# Patient Record
Sex: MALE | Race: WHITE | Employment: FULL TIME | URBAN - METROPOLITAN AREA
[De-identification: names, ages, dates, MRNs, and addresses within clinical notes are randomized per-mention and may not be internally consistent; named-entity substitution may affect disease eponyms.]

---

## 2019-03-06 ENCOUNTER — HOSPITAL ENCOUNTER (OUTPATIENT)
Age: 69
Discharge: HOME | End: 2019-03-06
Payer: MEDICARE

## 2019-03-06 LAB
ALBUMIN SERPL-MCNC: 3.9 G/DL (ref 3.5–5.2)
ALBUMIN/GLOBULIN RATIO: 1 (ref 1–2.5)
ALP BLD-CCNC: 94 U/L (ref 40–129)
ALT SERPL-CCNC: 20 U/L (ref 5–41)
ANION GAP SERPL CALCULATED.3IONS-SCNC: 12 MMOL/L (ref 9–17)
AST SERPL-CCNC: 15 U/L
BILIRUB SERPL-MCNC: 0.28 MG/DL (ref 0.3–1.2)
BILIRUBIN DIRECT: 0.08 MG/DL
BILIRUBIN, INDIRECT: 0.2 MG/DL (ref 0–1)
BUN BLDV-MCNC: 22 MG/DL (ref 8–23)
BUN/CREAT BLD: ABNORMAL (ref 9–20)
CALCIUM SERPL-MCNC: 9.4 MG/DL (ref 8.6–10.4)
CARCINOEMBRYONIC ANTIGEN: 5.9 NG/ML
CHLORIDE BLD-SCNC: 100 MMOL/L (ref 98–107)
CHOLESTEROL, FASTING: 208 MG/DL
CHOLESTEROL/HDL RATIO: 4.3
CO2: 26 MMOL/L (ref 20–31)
CREAT SERPL-MCNC: 0.91 MG/DL (ref 0.7–1.2)
CREATININE URINE: 72.3 MG/DL (ref 39–259)
ESTIMATED AVERAGE GLUCOSE: 358 MG/DL
GFR AFRICAN AMERICAN: >60 ML/MIN
GFR NON-AFRICAN AMERICAN: >60 ML/MIN
GFR SERPL CREATININE-BSD FRML MDRD: ABNORMAL ML/MIN/{1.73_M2}
GFR SERPL CREATININE-BSD FRML MDRD: ABNORMAL ML/MIN/{1.73_M2}
GLOBULIN: ABNORMAL G/DL (ref 1.5–3.8)
GLUCOSE FASTING: 387 MG/DL (ref 70–99)
HBA1C MFR BLD: 14.1 % (ref 4–6)
HCT VFR BLD CALC: 45.3 % (ref 40.7–50.3)
HDLC SERPL-MCNC: 48 MG/DL
HEMOGLOBIN: 14.6 G/DL (ref 13–17)
LDL CHOLESTEROL: 128 MG/DL (ref 0–130)
MCH RBC QN AUTO: 26.9 PG (ref 25.2–33.5)
MCHC RBC AUTO-ENTMCNC: 32.2 G/DL (ref 28.4–34.8)
MCV RBC AUTO: 83.4 FL (ref 82.6–102.9)
MICROALBUMIN/CREAT 24H UR: 193 MG/L
MICROALBUMIN/CREAT UR-RTO: 267 MCG/MG CREAT
NRBC AUTOMATED: 0 PER 100 WBC
PDW BLD-RTO: 12.5 % (ref 11.8–14.4)
PLATELET # BLD: 243 K/UL (ref 138–453)
PMV BLD AUTO: 10.4 FL (ref 8.1–13.5)
POTASSIUM SERPL-SCNC: 4.6 MMOL/L (ref 3.7–5.3)
PROSTATE SPECIFIC ANTIGEN: 13.46 UG/L
RBC # BLD: 5.43 M/UL (ref 4.21–5.77)
SODIUM BLD-SCNC: 138 MMOL/L (ref 135–144)
TOTAL PROTEIN: 7.8 G/DL (ref 6.4–8.3)
TRIGLYCERIDE, FASTING: 161 MG/DL
TSH SERPL DL<=0.05 MIU/L-ACNC: 1.24 MIU/L (ref 0.3–5)
VITAMIN D 25-HYDROXY: 27.3 NG/ML (ref 30–100)
VLDLC SERPL CALC-MCNC: ABNORMAL MG/DL (ref 1–30)
WBC # BLD: 6.1 K/UL (ref 3.5–11.3)

## 2019-03-06 PROCEDURE — 85027 COMPLETE CBC AUTOMATED: CPT

## 2019-03-06 PROCEDURE — 82378 CARCINOEMBRYONIC ANTIGEN: CPT

## 2019-03-06 PROCEDURE — 82043 UR ALBUMIN QUANTITATIVE: CPT

## 2019-03-06 PROCEDURE — 84443 ASSAY THYROID STIM HORMONE: CPT

## 2019-03-06 PROCEDURE — G0103 PSA SCREENING: HCPCS

## 2019-03-06 PROCEDURE — 36415 COLL VENOUS BLD VENIPUNCTURE: CPT

## 2019-03-06 PROCEDURE — 82306 VITAMIN D 25 HYDROXY: CPT

## 2019-03-06 PROCEDURE — 83036 HEMOGLOBIN GLYCOSYLATED A1C: CPT

## 2019-03-06 PROCEDURE — 82570 ASSAY OF URINE CREATININE: CPT

## 2019-03-06 PROCEDURE — 80076 HEPATIC FUNCTION PANEL: CPT

## 2019-03-06 PROCEDURE — 80048 BASIC METABOLIC PNL TOTAL CA: CPT

## 2019-03-06 PROCEDURE — 80061 LIPID PANEL: CPT

## 2019-04-01 ENCOUNTER — OFFICE VISIT (OUTPATIENT)
Dept: GASTROENTEROLOGY | Age: 69
End: 2019-04-01
Payer: MEDICARE

## 2019-04-01 VITALS
DIASTOLIC BLOOD PRESSURE: 93 MMHG | HEIGHT: 67 IN | SYSTOLIC BLOOD PRESSURE: 164 MMHG | BODY MASS INDEX: 17.89 KG/M2 | WEIGHT: 114 LBS | HEART RATE: 67 BPM

## 2019-04-01 DIAGNOSIS — R97.0 ELEVATED CEA: Primary | ICD-10-CM

## 2019-04-01 PROCEDURE — 99204 OFFICE O/P NEW MOD 45 MIN: CPT | Performed by: INTERNAL MEDICINE

## 2019-04-01 PROCEDURE — 3017F COLORECTAL CA SCREEN DOC REV: CPT | Performed by: INTERNAL MEDICINE

## 2019-04-01 PROCEDURE — G8419 CALC BMI OUT NRM PARAM NOF/U: HCPCS | Performed by: INTERNAL MEDICINE

## 2019-04-01 PROCEDURE — G8427 DOCREV CUR MEDS BY ELIG CLIN: HCPCS | Performed by: INTERNAL MEDICINE

## 2019-04-01 RX ORDER — LISINOPRIL 5 MG/1
5 TABLET ORAL DAILY
COMMUNITY
End: 2019-09-09

## 2019-04-01 ASSESSMENT — ENCOUNTER SYMPTOMS
CHEST TIGHTNESS: 0
DIARRHEA: 0
EYE PAIN: 0
ABDOMINAL PAIN: 0
WHEEZING: 0
SHORTNESS OF BREATH: 0
SINUS PAIN: 0
ABDOMINAL DISTENTION: 0
SINUS PRESSURE: 0
RECTAL PAIN: 0
BACK PAIN: 0
BLOOD IN STOOL: 0
VOMITING: 0
NAUSEA: 0
TROUBLE SWALLOWING: 0
CONSTIPATION: 1
ANAL BLEEDING: 0
EYE REDNESS: 0

## 2019-04-01 NOTE — PROGRESS NOTES
Subjective:      Patient ID: Vimal Porras is a 68 y.o. male. HPI     Dr. Marni Horvath MD has requested that I see Vimal Porras for a consult for No diagnosis found. .        Patient seen with a history of elevated CEA level. His CEA level was in normal range in 2016. At present CEA level is 5.9. Patient is not a smoker. He does not have COPD. Denies dysphagia, heartburn or dyspepsia. Has a good appetite and there is no weight loss. Has a satisfactory bowel movements. No melena or hematochezia. Patient appears to have other medical issues including uncontrolled diabetes. His PSA is elevated at 13.46. Hemoglobin 14.6. Platelet count 490. Liver battery within normal limits. No family history of GI malignancy. Patient denies colonoscopy in the past.  He denies any GI issues in the past.        Past Medical, Family, and Social History reviewed and does contribute to the patient presenting condition. patient\"s PMH/PSH,SH,PSYCH hx, MEDs, ALLERGIES, and ROS was all reviewed and updated ion the appropriate sections        Review of Systems   Constitutional: Negative for appetite change, fatigue and unexpected weight change. HENT: Negative for sinus pressure, sinus pain and trouble swallowing. Eyes: Positive for visual disturbance (glasses). Negative for pain and redness. Respiratory: Negative for chest tightness, shortness of breath and wheezing. Cardiovascular: Negative for chest pain, palpitations and leg swelling. Gastrointestinal: Positive for constipation. Negative for abdominal distention, abdominal pain, anal bleeding, blood in stool, diarrhea, nausea, rectal pain and vomiting. Endocrine: Negative for cold intolerance and heat intolerance. Genitourinary: Negative for difficulty urinating, frequency and urgency. Musculoskeletal: Negative for arthralgias, back pain and neck pain. Skin: Negative.     Allergic/Immunologic: Negative for environmental allergies and food allergies. Neurological: Negative for dizziness, weakness and headaches. Hematological: Does not bruise/bleed easily. Psychiatric/Behavioral: Negative for agitation and sleep disturbance. The patient is not nervous/anxious. Objective:   Physical Exam   Constitutional: He is oriented to person, place, and time. He appears well-developed and well-nourished. No distress. HENT:   Head: Normocephalic and atraumatic. Mouth/Throat: No oropharyngeal exudate. Eyes: Pupils are equal, round, and reactive to light. Conjunctivae are normal. No scleral icterus. Neck: Normal range of motion. Neck supple. No tracheal deviation present. No thyromegaly present. Cardiovascular: Normal rate, regular rhythm, normal heart sounds and intact distal pulses. No murmur heard. Pulmonary/Chest: Effort normal and breath sounds normal. No respiratory distress. He has no wheezes. He has no rales. Abdominal: Soft. Bowel sounds are normal. He exhibits no distension, no ascites and no mass. There is no hepatomegaly. There is no tenderness. There is no guarding. No hernia. No peripheral signs of ch. Liver disease   Genitourinary: Rectum normal. Rectal exam shows guaiac negative stool. Musculoskeletal: He exhibits no edema. Lymphadenopathy:     He has no cervical adenopathy. Neurological: He is alert and oriented to person, place, and time. No cranial nerve deficit. Skin: Skin is warm and dry. No rash noted. No erythema. Psychiatric: Thought content normal.   Nursing note and vitals reviewed. Assessment:       Diagnosis Orders   1. Elevated CEA  COLONOSCOPY W/ OR W/O BIOPSY           Plan: At present his stool is negative for occult blood. Abdominal examination benign.     Given his age, CEA level is elevated, he needs colonoscopy to evaluate colonic pathology including malignancy, polyps etc.    Patient is advised to see PCP regarding markedly elevated PSA levels, and abnormal hemoglobin A1c.    After discussion patient understood and agreed. The Endoscopic procedure was explained to the patient in detail  The prep and NPO were explained  All the Risks, Benefits, and Alternatives were explained  Risk of Bleeding, Perforation and Cardio Respiratory risks were explained  his questions were answered  The procedure has been scheduled with the  in the office  Patient was asked to give us a call for any questions  The patient has verbalized understanding and agreement to this plan.

## 2019-05-03 ENCOUNTER — TELEPHONE (OUTPATIENT)
Dept: GASTROENTEROLOGY | Age: 69
End: 2019-05-03

## 2019-05-06 ENCOUNTER — HOSPITAL ENCOUNTER (OUTPATIENT)
Age: 69
Setting detail: OUTPATIENT SURGERY
Discharge: HOME | End: 2019-05-06
Attending: INTERNAL MEDICINE | Admitting: INTERNAL MEDICINE
Payer: MEDICARE

## 2019-05-06 ENCOUNTER — ANESTHESIA (OUTPATIENT)
Dept: OPERATING ROOM | Age: 69
End: 2019-05-06
Payer: MEDICARE

## 2019-05-06 ENCOUNTER — ANESTHESIA EVENT (OUTPATIENT)
Dept: OPERATING ROOM | Age: 69
End: 2019-05-06
Payer: MEDICARE

## 2019-05-06 VITALS — DIASTOLIC BLOOD PRESSURE: 60 MMHG | OXYGEN SATURATION: 100 % | SYSTOLIC BLOOD PRESSURE: 102 MMHG

## 2019-05-06 VITALS
OXYGEN SATURATION: 100 % | RESPIRATION RATE: 14 BRPM | TEMPERATURE: 97.7 F | HEIGHT: 67 IN | HEART RATE: 66 BPM | DIASTOLIC BLOOD PRESSURE: 82 MMHG | BODY MASS INDEX: 17.89 KG/M2 | WEIGHT: 114 LBS | SYSTOLIC BLOOD PRESSURE: 153 MMHG

## 2019-05-06 LAB
CARCINOEMBRYONIC ANTIGEN: 3.8 NG/ML
GLUCOSE BLD-MCNC: 102 MG/DL (ref 75–110)
GLUCOSE BLD-MCNC: 98 MG/DL (ref 75–110)

## 2019-05-06 PROCEDURE — 2580000003 HC RX 258: Performed by: ANESTHESIOLOGY

## 2019-05-06 PROCEDURE — 36415 COLL VENOUS BLD VENIPUNCTURE: CPT

## 2019-05-06 PROCEDURE — 82947 ASSAY GLUCOSE BLOOD QUANT: CPT

## 2019-05-06 PROCEDURE — 2709999900 HC NON-CHARGEABLE SUPPLY: Performed by: INTERNAL MEDICINE

## 2019-05-06 PROCEDURE — 3700000001 HC ADD 15 MINUTES (ANESTHESIA): Performed by: INTERNAL MEDICINE

## 2019-05-06 PROCEDURE — 82378 CARCINOEMBRYONIC ANTIGEN: CPT

## 2019-05-06 PROCEDURE — 6360000002 HC RX W HCPCS: Performed by: SPECIALIST

## 2019-05-06 PROCEDURE — 7100000000 HC PACU RECOVERY - FIRST 15 MIN: Performed by: INTERNAL MEDICINE

## 2019-05-06 PROCEDURE — 2500000003 HC RX 250 WO HCPCS: Performed by: SPECIALIST

## 2019-05-06 PROCEDURE — 7100000001 HC PACU RECOVERY - ADDTL 15 MIN: Performed by: INTERNAL MEDICINE

## 2019-05-06 PROCEDURE — 3700000000 HC ANESTHESIA ATTENDED CARE: Performed by: INTERNAL MEDICINE

## 2019-05-06 PROCEDURE — 3609009500 HC COLONOSCOPY DIAGNOSTIC OR SCREENING: Performed by: INTERNAL MEDICINE

## 2019-05-06 RX ORDER — AMLODIPINE BESYLATE 5 MG/1
5 TABLET ORAL DAILY
Refills: 5 | COMMUNITY
Start: 2019-04-29

## 2019-05-06 RX ORDER — METOPROLOL TARTRATE 50 MG/1
50 TABLET, FILM COATED ORAL 2 TIMES DAILY
Refills: 5 | COMMUNITY
Start: 2019-04-16

## 2019-05-06 RX ORDER — PROPOFOL 10 MG/ML
INJECTION, EMULSION INTRAVENOUS PRN
Status: DISCONTINUED | OUTPATIENT
Start: 2019-05-06 | End: 2019-05-06 | Stop reason: SDUPTHER

## 2019-05-06 RX ORDER — SODIUM CHLORIDE 9 MG/ML
INJECTION, SOLUTION INTRAVENOUS CONTINUOUS
Status: DISCONTINUED | OUTPATIENT
Start: 2019-05-06 | End: 2019-05-06 | Stop reason: HOSPADM

## 2019-05-06 RX ORDER — LIDOCAINE HYDROCHLORIDE 10 MG/ML
INJECTION, SOLUTION EPIDURAL; INFILTRATION; INTRACAUDAL; PERINEURAL PRN
Status: DISCONTINUED | OUTPATIENT
Start: 2019-05-06 | End: 2019-05-06 | Stop reason: SDUPTHER

## 2019-05-06 RX ORDER — LISINOPRIL 20 MG/1
20 TABLET ORAL 2 TIMES DAILY
Refills: 5 | COMMUNITY
Start: 2019-04-16

## 2019-05-06 RX ORDER — POLYETHYLENE GLYCOL-3350 AND ELECTROLYTES 236; 6.74; 5.86; 2.97; 22.74 G/274.31G; G/274.31G; G/274.31G; G/274.31G; G/274.31G
POWDER, FOR SOLUTION ORAL
Refills: 0 | COMMUNITY
Start: 2019-04-02 | End: 2019-09-09

## 2019-05-06 RX ORDER — GLIPIZIDE 5 MG/1
5 TABLET ORAL
Refills: 5 | COMMUNITY
Start: 2019-04-29 | End: 2021-07-15

## 2019-05-06 RX ADMIN — SODIUM CHLORIDE: 9 INJECTION, SOLUTION INTRAVENOUS at 08:45

## 2019-05-06 RX ADMIN — PROPOFOL 50 MG: 10 INJECTION, EMULSION INTRAVENOUS at 08:48

## 2019-05-06 RX ADMIN — PROPOFOL 20 MG: 10 INJECTION, EMULSION INTRAVENOUS at 09:00

## 2019-05-06 RX ADMIN — SODIUM CHLORIDE: 9 INJECTION, SOLUTION INTRAVENOUS at 08:11

## 2019-05-06 RX ADMIN — LIDOCAINE HYDROCHLORIDE 25 MG: 10 INJECTION, SOLUTION EPIDURAL; INFILTRATION; INTRACAUDAL; PERINEURAL at 08:48

## 2019-05-06 RX ADMIN — PROPOFOL 50 MG: 10 INJECTION, EMULSION INTRAVENOUS at 08:54

## 2019-05-06 SDOH — HEALTH STABILITY: MENTAL HEALTH: HOW OFTEN DO YOU HAVE A DRINK CONTAINING ALCOHOL?: NEVER

## 2019-05-06 ASSESSMENT — PULMONARY FUNCTION TESTS
PIF_VALUE: 1
PIF_VALUE: 0
PIF_VALUE: 1

## 2019-05-06 ASSESSMENT — PAIN SCALES - GENERAL: PAINLEVEL_OUTOF10: 0

## 2019-05-06 ASSESSMENT — PAIN - FUNCTIONAL ASSESSMENT: PAIN_FUNCTIONAL_ASSESSMENT: 0-10

## 2019-05-06 NOTE — OP NOTE
5. Precautions to avoid constipation     Next colonoscopy: 10 years.       Electronically signed by Cherie Villafuerte MD  on 5/6/2019 at 9:18 AM

## 2019-05-06 NOTE — ANESTHESIA POSTPROCEDURE EVALUATION
Department of Anesthesiology  Postprocedure Note    Patient: Madi Bernstein  MRN: [id number]  YOB: 1950  Date of evaluation: 5/6/2019  Time:  5:24 PM     Procedure Summary     Date:  05/06/19 Room / Location:  [address] [place] [address] 01 / [address]    Anesthesia Start:  0845 Anesthesia Stop:  6500    Procedure:  COLONOSCOPY DIAGNOSTIC (N/A ) Diagnosis:  (ELEVATED CEA   PAT ON ADMIT PER ANES)    Surgeon:  Violet Mack MD Responsible Provider:  Genesis Muniz MD    Anesthesia Type:  MAC ASA Status:  3          Anesthesia Type: MAC    Kellie Phase I: Ekllie Score: 10    Kellie Phase II:      Last vitals: Reviewed and per EMR flowsheets.        Anesthesia Post Evaluation    Comments: POST- ANESTHESIA EVALUATION       Pt Name: Madi Bernstein  MRN: [id number]  YOB: 1950  Date of evaluation: 5/6/2019  Time:  5:24 PM      BP (!) 153/82   Pulse 66   Temp 97.7 °F (36.5 °C) (Infrared)   Resp 14   Ht 5' 6.5\" (1.689 m)   Wt 114 lb (51.7 kg)   SpO2 100%   BMI 18.12 kg/m²      Consciousness Level  Awake  Cardiopulmonary Status  Stable  Pain Adequately Treated YES  Nausea / Vomiting  NO  Adequate Hydration  YES  Anesthesia Related Complications NONE      Electronically signed by Genesis Muniz MD on 5/6/2019 at 5:24 PM

## 2019-05-06 NOTE — ANESTHESIA PRE PROCEDURE
Department of Anesthesiology  Preprocedure Note       Name:  Bella Gayle   Age:  68 y.o.  :  1950                                          MRN:  [id number]         Date:  2019      Surgeon: Karen Soto):  [facility] [address], MD    Procedure: COLONOSCOPY DIAGNOSTIC (N/A )    Medications prior to admission:   Prior to Admission medications    Medication Sig Start Date End Date Taking? Authorizing Provider   amLODIPine (NORVASC) 5 MG tablet  19  Yes Historical Provider, MD   glipiZIDE (GLUCOTROL) 5 MG tablet  19  Yes Historical Provider, MD   lisinopril (PRINIVIL;ZESTRIL) 20 MG tablet  19  Yes Historical Provider, MD   metoprolol tartrate (LOPRESSOR) 50 MG tablet  19  Yes Historical Provider, MD   GAVILYTE-G 236 g solution  19  Yes Historical Provider, MD   metFORMIN (GLUCOPHAGE) 500 MG tablet Take 500 mg by mouth 2 times daily (with meals)   Yes Historical Provider, MD   lisinopril (PRINIVIL;ZESTRIL) 5 MG tablet Take 5 mg by mouth daily   Yes Historical Provider, MD       Current medications:    Current Facility-Administered Medications   Medication Dose Route Frequency Provider Last Rate Last Dose    0.9 % sodium chloride infusion   Intravenous Continuous [place] MD Kelley 100 mL/hr at 19 [id number]         Allergies:  No Known Allergies    Problem List:  There is no problem list on file for this patient.       Past Medical History:        Diagnosis Date    Diabetes mellitus ([address]     Hypertension        Past Surgical History:        Procedure Laterality Date    COLONOSCOPY      HERNIA REPAIR Right     inguinal       Social History:    Social History     Tobacco Use    Smoking status: Never Smoker    Smokeless tobacco: Never Used   Substance Use Topics    Alcohol use: Never     Frequency: Never                                Counseling given: Not Answered      Vital Signs (Current):   Vitals:    19 0744 19 0747   BP:  137/83   Pulse:  68   Resp:  16   Temp:  97.5 °F (36.4 °C)   TempSrc:  Oral   SpO2:  100%   Weight: 114 lb (51.7 kg)    Height: 5' 6.5\" (1.689 m)                                               BP Readings from Last 3 Encounters:   05/06/19 137/83   04/01/19 (!) 164/93       NPO Status: Time of last liquid consumption: 2320                        Time of last solid consumption: 2100                        Date of last liquid consumption: 05/05/19                        Date of last solid food consumption: 05/04/19    BMI:   Wt Readings from Last 3 Encounters:   05/06/19 114 lb (51.7 kg)   04/01/19 114 lb (51.7 kg)     Body mass index is 18.12 kg/m².     CBC:   Lab Results   Component Value Date    WBC 6.1 03/06/2019    RBC 5.43 03/06/2019    HGB 14.6 03/06/2019    HCT 45.3 03/06/2019    MCV 83.4 03/06/2019    RDW 12.5 03/06/2019     03/06/2019       CMP:   Lab Results   Component Value Date     03/06/2019    K 4.6 03/06/2019     03/06/2019    CO2 26 03/06/2019    BUN 22 03/06/2019    CREATININE 0.91 03/06/2019    GFRAA >60 03/06/2019    LABGLOM >60 03/06/2019    GLUCOSE 238 10/04/2016    PROT 7.8 03/06/2019    CALCIUM 9.4 03/06/2019    BILITOT 0.28 03/06/2019    ALKPHOS 94 03/06/2019    AST 15 03/06/2019    ALT 20 03/06/2019       POC Tests:   Recent Labs     05/06/19  0809   POCGLU 102       Coags: No results found for: PROTIME, INR, APTT    HCG (If Applicable): No results found for: PREGTESTUR, PREGSERUM, HCG, HCGQUANT     ABGs: No results found for: PHART, PO2ART, IDO8GCC, RIJ7QWF, BEART, M0AHHMGQ     Type & Screen (If Applicable):  No results found for: LABABO, [address]    Anesthesia Evaluation  Patient summary reviewed and Nursing notes reviewed no history of anesthetic complications:   Airway: Mallampati: II  TM distance: >3 FB   Neck ROM: full  Mouth opening: > = 3 FB Dental: normal exam         Pulmonary:Negative Pulmonary ROS and normal exam  breath sounds clear to auscultation                             Cardiovascular:    (+) hypertension:,         Rhythm: regular  Rate: normal                    Neuro/Psych:   Negative Neuro/Psych ROS              GI/Hepatic/Renal: Neg GI/Hepatic/Renal ROS            Endo/Other:    (+) DiabetesType II DM, well controlled, , .                 Abdominal:           Vascular: negative vascular ROS. Anesthesia Plan      MAC     ASA 3       Induction: intravenous. MIPS: Postoperative opioids intended and Prophylactic antiemetics administered. Anesthetic plan and risks discussed with patient.                       Juan Daniel Morales MD   5/6/2019

## 2019-05-06 NOTE — H&P
HISTORY and [address]       NAME:  Niki Gtz  MRN: [id number]   YOB: 1950   Date: 5/6/2019   Age: 68 y.o. Gender: male       COMPLAINT AND PRESENT HISTORY:     Niki Gtz is 68 y.o., [place] [facility])  male, having a Dx. Colonoscopy. Elevated CEA 6 weeks ago, as a part of his annual workup. Patient denies any GI symptoms. No nausea / vomiting, No diarrhea or constipation. No abdominal pains or cramping, No heartburn, no changes in the color of the stools. No fever or chills. No recent weight loss. PAST MEDICAL HISTORY     Past Medical History:   Diagnosis Date    Diabetes mellitus ([address]     Hypertension        SURGICAL HISTORY       Past Surgical History:   Procedure Laterality Date    COLONOSCOPY      HERNIA REPAIR Right     inguinal       FAMILY HISTORY     History reviewed. No pertinent family history. SOCIAL HISTORY       Social History     Socioeconomic History    Marital status:       Spouse name: None    Number of children: None    Years of education: None    Highest education level: None   Occupational History    None   Social Needs    Financial resource strain: None    Food insecurity:     Worry: None     Inability: None    Transportation needs:     Medical: None     Non-medical: None   Tobacco Use    Smoking status: Never Smoker    Smokeless tobacco: Never Used   Substance and Sexual Activity    Alcohol use: Never     Frequency: Never    Drug use: Never    Sexual activity: None   Lifestyle    Physical activity:     Days per week: None     Minutes per session: None    Stress: None   Relationships    Social connections:     Talks on phone: None     Gets together: None     Attends [employer] service: None     Active member of club or organization: None     Attends meetings of clubs or organizations: None     Relationship status: None    Intimate partner violence:     Fear of current or ex partner: None     Emotionally abused:

## 2019-09-09 ENCOUNTER — HOSPITAL ENCOUNTER (OUTPATIENT)
Dept: PREADMISSION TESTING | Age: 69
Discharge: HOME | End: 2019-09-13
Payer: MEDICARE

## 2019-09-09 VITALS
OXYGEN SATURATION: 97 % | HEART RATE: 68 BPM | TEMPERATURE: 98.1 F | RESPIRATION RATE: 16 BRPM | DIASTOLIC BLOOD PRESSURE: 84 MMHG | BODY MASS INDEX: 23.31 KG/M2 | HEIGHT: 67 IN | SYSTOLIC BLOOD PRESSURE: 138 MMHG | WEIGHT: 148.5 LBS

## 2019-09-09 LAB
ABO/RH: NORMAL
ANION GAP SERPL CALCULATED.3IONS-SCNC: 12 MMOL/L (ref 9–17)
ANTIBODY SCREEN: NEGATIVE
ARM BAND NUMBER: NORMAL
BUN BLDV-MCNC: 24 MG/DL (ref 8–23)
CHLORIDE BLD-SCNC: 97 MMOL/L (ref 98–107)
CO2: 27 MMOL/L (ref 20–31)
CREAT SERPL-MCNC: 0.78 MG/DL (ref 0.7–1.2)
EXPIRATION DATE: NORMAL
GFR AFRICAN AMERICAN: >60 ML/MIN
GFR NON-AFRICAN AMERICAN: >60 ML/MIN
GFR SERPL CREATININE-BSD FRML MDRD: ABNORMAL ML/MIN/{1.73_M2}
GFR SERPL CREATININE-BSD FRML MDRD: ABNORMAL ML/MIN/{1.73_M2}
GLUCOSE BLD-MCNC: 267 MG/DL (ref 70–99)
HCT VFR BLD CALC: 43.4 % (ref 40.7–50.3)
HEMOGLOBIN: 13.9 G/DL (ref 13–17)
MCH RBC QN AUTO: 26.7 PG (ref 25.2–33.5)
MCHC RBC AUTO-ENTMCNC: 32 G/DL (ref 28.4–34.8)
MCV RBC AUTO: 83.5 FL (ref 82.6–102.9)
NRBC AUTOMATED: 0 PER 100 WBC
PDW BLD-RTO: 12.7 % (ref 11.8–14.4)
PLATELET # BLD: 245 K/UL (ref 138–453)
PMV BLD AUTO: 10 FL (ref 8.1–13.5)
POTASSIUM SERPL-SCNC: 4.3 MMOL/L (ref 3.7–5.3)
RBC # BLD: 5.2 M/UL (ref 4.21–5.77)
SODIUM BLD-SCNC: 136 MMOL/L (ref 135–144)
WBC # BLD: 6.4 K/UL (ref 3.5–11.3)

## 2019-09-09 PROCEDURE — 87086 URINE CULTURE/COLONY COUNT: CPT

## 2019-09-09 PROCEDURE — 80051 ELECTROLYTE PANEL: CPT

## 2019-09-09 PROCEDURE — 84520 ASSAY OF UREA NITROGEN: CPT

## 2019-09-09 PROCEDURE — 82565 ASSAY OF CREATININE: CPT

## 2019-09-09 PROCEDURE — 86901 BLOOD TYPING SEROLOGIC RH(D): CPT

## 2019-09-09 PROCEDURE — 86850 RBC ANTIBODY SCREEN: CPT

## 2019-09-09 PROCEDURE — 85027 COMPLETE CBC AUTOMATED: CPT

## 2019-09-09 PROCEDURE — 36415 COLL VENOUS BLD VENIPUNCTURE: CPT

## 2019-09-09 PROCEDURE — 86900 BLOOD TYPING SEROLOGIC ABO: CPT

## 2019-09-09 PROCEDURE — 82947 ASSAY GLUCOSE BLOOD QUANT: CPT

## 2019-09-09 PROCEDURE — 93005 ELECTROCARDIOGRAM TRACING: CPT | Performed by: ANESTHESIOLOGY

## 2019-09-09 RX ORDER — SODIUM CHLORIDE, SODIUM LACTATE, POTASSIUM CHLORIDE, CALCIUM CHLORIDE 600; 310; 30; 20 MG/100ML; MG/100ML; MG/100ML; MG/100ML
1000 INJECTION, SOLUTION INTRAVENOUS CONTINUOUS
Status: CANCELLED | OUTPATIENT
Start: 2019-09-09

## 2019-09-09 NOTE — H&P
no acute distress. Very pleasant and talkative. SKIN:  Warm and dry, no rashes   HEAD:  Normocephalic, atraumatic   EYES: PERRL. EOMs intact. Wearing glasses. EARS:  Hearing grossly WNL. NOSE:  Nares patent. No rhinorrhea. MOUTH/THROAT:  benign  NECK:supple, no lymphadenopathy  LUNGS: Clear to auscultation bilaterally, no wheezes. CARDIOVASCULAR: Heart sounds are normal.  Regular rate and rhythm without murmur. ABDOMEN: soft, non tender, non distended. EXTREMITIES: no edema bilateral lower extremities. Testing:   EK19  Labs pending: drawn 2019     IMPRESSIONS:   1. Prostate cancer  2.  has a past medical history of Diabetes mellitus ([address] History of TIA (transient ischemic attack), Hypertension, Prostate cancer ([address] Wears dentures, and Wears glasses.    PLANS:   1. prostatectomy    ASHLEY KOHLER PA-C  Electronically signed 2019 at 4:22 PM

## 2019-09-09 NOTE — PROGRESS NOTES
Anesthesia Focused Assessment    STOP-BANG Sleep Apnea Questionnaire    SNORE loudly (heard through closed doors)? No  TIRED, fatigued, sleepy during daytime? No  OBSERVED stopping breathing during sleep? No  High blood PRESSURE being treated? Yes    BMI over 35? No  AGE over 48? Yes  NECK circumference over 16\"? No  GENDER (male)? Yes             Total 3  High risk 5-8  Intermediate risk 3-4  Low risk 0-2    Obstructive Sleep Apnea: denies  If YES, machine used: no     Type 1 DM:   no  T2DM:  yes    Coronary Artery Disease:  no  Hypertension:  yes    Active smoker:  no  Drinks Alcohol:  no    Dentition: benign    Defib / AICD / Pacemaker: no      Renal Failure/dialysis:  no    Patient was evaluated in PAT & anesthesia guidelines were applied. NPO guidelines, medication instructions and scheduled arrival time were reviewed with patient. Hx of anesthesia complications:  no  Family hx of anesthesia complications:  no                                                                                                                     Anesthesia contacted:   Yes, Dr. Guadalupe Speed or cardiac clearance ordered: no clearance per Dr. Kirill Vo.     Romulo Bergman PA-C  9/9/19  3:47 PM

## 2019-09-10 LAB
CULTURE: NO GROWTH
Lab: NORMAL
SPECIMEN DESCRIPTION: NORMAL

## 2019-09-12 LAB
EKG ATRIAL RATE: 62 BPM
EKG P AXIS: 26 DEGREES
EKG P-R INTERVAL: 178 MS
EKG Q-T INTERVAL: 434 MS
EKG QRS DURATION: 104 MS
EKG QTC CALCULATION (BAZETT): 440 MS
EKG R AXIS: -21 DEGREES
EKG T AXIS: 41 DEGREES
EKG VENTRICULAR RATE: 62 BPM

## 2019-09-12 PROCEDURE — 93010 ELECTROCARDIOGRAM REPORT: CPT | Performed by: INTERNAL MEDICINE

## 2019-09-18 ENCOUNTER — ANESTHESIA EVENT (OUTPATIENT)
Dept: OPERATING ROOM | Age: 69
End: 2019-09-18
Payer: MEDICARE

## 2019-09-18 ENCOUNTER — HOSPITAL ENCOUNTER (OUTPATIENT)
Age: 69
Setting detail: OBSERVATION
Discharge: HOME | End: 2019-09-19
Attending: UROLOGY | Admitting: UROLOGY
Payer: MEDICARE

## 2019-09-18 ENCOUNTER — ANESTHESIA (OUTPATIENT)
Dept: OPERATING ROOM | Age: 69
End: 2019-09-18
Payer: MEDICARE

## 2019-09-18 VITALS — TEMPERATURE: 94.5 F | DIASTOLIC BLOOD PRESSURE: 93 MMHG | OXYGEN SATURATION: 100 % | SYSTOLIC BLOOD PRESSURE: 168 MMHG

## 2019-09-18 DIAGNOSIS — G89.18 POST-OPERATIVE PAIN: Primary | ICD-10-CM

## 2019-09-18 DIAGNOSIS — C61 PROSTATE CANCER (HCC): ICD-10-CM

## 2019-09-18 LAB
ANION GAP SERPL CALCULATED.3IONS-SCNC: 13 MMOL/L (ref 9–17)
BUN BLDV-MCNC: 25 MG/DL (ref 8–23)
BUN/CREAT BLD: ABNORMAL (ref 9–20)
CALCIUM SERPL-MCNC: 8.5 MG/DL (ref 8.6–10.4)
CHLORIDE BLD-SCNC: 99 MMOL/L (ref 98–107)
CO2: 24 MMOL/L (ref 20–31)
CREAT SERPL-MCNC: 0.88 MG/DL (ref 0.7–1.2)
GFR AFRICAN AMERICAN: >60 ML/MIN
GFR NON-AFRICAN AMERICAN: >60 ML/MIN
GFR SERPL CREATININE-BSD FRML MDRD: ABNORMAL ML/MIN/{1.73_M2}
GFR SERPL CREATININE-BSD FRML MDRD: ABNORMAL ML/MIN/{1.73_M2}
GLUCOSE BLD-MCNC: 244 MG/DL (ref 75–110)
GLUCOSE BLD-MCNC: 258 MG/DL (ref 75–110)
GLUCOSE BLD-MCNC: 284 MG/DL (ref 75–110)
GLUCOSE BLD-MCNC: 302 MG/DL (ref 75–110)
GLUCOSE BLD-MCNC: 318 MG/DL (ref 75–110)
GLUCOSE BLD-MCNC: 342 MG/DL (ref 70–99)
GLUCOSE BLD-MCNC: 346 MG/DL (ref 75–110)
HCT VFR BLD CALC: 40.9 % (ref 40.7–50.3)
HEMOGLOBIN: 12.7 G/DL (ref 13–17)
MCH RBC QN AUTO: 26 PG (ref 25.2–33.5)
MCHC RBC AUTO-ENTMCNC: 31.1 G/DL (ref 28.4–34.8)
MCV RBC AUTO: 83.8 FL (ref 82.6–102.9)
NRBC AUTOMATED: 0 PER 100 WBC
PDW BLD-RTO: 12.8 % (ref 11.8–14.4)
PLATELET # BLD: 245 K/UL (ref 138–453)
PMV BLD AUTO: 10 FL (ref 8.1–13.5)
POTASSIUM SERPL-SCNC: 4.3 MMOL/L (ref 3.7–5.3)
RBC # BLD: 4.88 M/UL (ref 4.21–5.77)
SODIUM BLD-SCNC: 136 MMOL/L (ref 135–144)
WBC # BLD: 10.2 K/UL (ref 3.5–11.3)

## 2019-09-18 PROCEDURE — 82947 ASSAY GLUCOSE BLOOD QUANT: CPT

## 2019-09-18 PROCEDURE — 6360000002 HC RX W HCPCS: Performed by: NURSE ANESTHETIST, CERTIFIED REGISTERED

## 2019-09-18 PROCEDURE — 6370000000 HC RX 637 (ALT 250 FOR IP): Performed by: STUDENT IN AN ORGANIZED HEALTH CARE EDUCATION/TRAINING PROGRAM

## 2019-09-18 PROCEDURE — 2500000003 HC RX 250 WO HCPCS: Performed by: NURSE ANESTHETIST, CERTIFIED REGISTERED

## 2019-09-18 PROCEDURE — 88309 TISSUE EXAM BY PATHOLOGIST: CPT

## 2019-09-18 PROCEDURE — 2709999900 HC NON-CHARGEABLE SUPPLY: Performed by: UROLOGY

## 2019-09-18 PROCEDURE — 2580000003 HC RX 258: Performed by: STUDENT IN AN ORGANIZED HEALTH CARE EDUCATION/TRAINING PROGRAM

## 2019-09-18 PROCEDURE — 7100000000 HC PACU RECOVERY - FIRST 15 MIN: Performed by: UROLOGY

## 2019-09-18 PROCEDURE — 2580000003 HC RX 258: Performed by: ANESTHESIOLOGY

## 2019-09-18 PROCEDURE — 2580000003 HC RX 258: Performed by: UROLOGY

## 2019-09-18 PROCEDURE — 6360000002 HC RX W HCPCS: Performed by: STUDENT IN AN ORGANIZED HEALTH CARE EDUCATION/TRAINING PROGRAM

## 2019-09-18 PROCEDURE — 3600000009 HC SURGERY ROBOT BASE: Performed by: UROLOGY

## 2019-09-18 PROCEDURE — 6360000002 HC RX W HCPCS: Performed by: UROLOGY

## 2019-09-18 PROCEDURE — 2580000003 HC RX 258: Performed by: NURSE ANESTHETIST, CERTIFIED REGISTERED

## 2019-09-18 PROCEDURE — 3600000019 HC SURGERY ROBOT ADDTL 15MIN: Performed by: UROLOGY

## 2019-09-18 PROCEDURE — 6360000002 HC RX W HCPCS: Performed by: ANESTHESIOLOGY

## 2019-09-18 PROCEDURE — 80048 BASIC METABOLIC PNL TOTAL CA: CPT

## 2019-09-18 PROCEDURE — 7100000001 HC PACU RECOVERY - ADDTL 15 MIN: Performed by: UROLOGY

## 2019-09-18 PROCEDURE — 88307 TISSUE EXAM BY PATHOLOGIST: CPT

## 2019-09-18 PROCEDURE — S2900 ROBOTIC SURGICAL SYSTEM: HCPCS | Performed by: UROLOGY

## 2019-09-18 PROCEDURE — 85027 COMPLETE CBC AUTOMATED: CPT

## 2019-09-18 PROCEDURE — 2780000010 HC IMPLANT OTHER: Performed by: UROLOGY

## 2019-09-18 PROCEDURE — 3700000001 HC ADD 15 MINUTES (ANESTHESIA): Performed by: UROLOGY

## 2019-09-18 PROCEDURE — G0378 HOSPITAL OBSERVATION PER HR: HCPCS

## 2019-09-18 PROCEDURE — 3700000000 HC ANESTHESIA ATTENDED CARE: Performed by: UROLOGY

## 2019-09-18 PROCEDURE — 87086 URINE CULTURE/COLONY COUNT: CPT

## 2019-09-18 DEVICE — SEALANT HEMSTAT 5ML HUM FIBRIN THROM 2 VI APPL DEV EVICEL: Type: IMPLANTABLE DEVICE | Site: ABDOMEN | Status: FUNCTIONAL

## 2019-09-18 RX ORDER — SODIUM CHLORIDE 9 MG/ML
INJECTION, SOLUTION INTRAVENOUS CONTINUOUS
Status: DISCONTINUED | OUTPATIENT
Start: 2019-09-18 | End: 2019-09-19

## 2019-09-18 RX ORDER — ONDANSETRON 2 MG/ML
4 INJECTION INTRAMUSCULAR; INTRAVENOUS
Status: DISCONTINUED | OUTPATIENT
Start: 2019-09-18 | End: 2019-09-18 | Stop reason: HOSPADM

## 2019-09-18 RX ORDER — METOPROLOL TARTRATE 50 MG/1
50 TABLET, FILM COATED ORAL 2 TIMES DAILY
Status: DISCONTINUED | OUTPATIENT
Start: 2019-09-18 | End: 2019-09-19 | Stop reason: HOSPADM

## 2019-09-18 RX ORDER — PHENYLEPHRINE HYDROCHLORIDE 10 MG/ML
INJECTION INTRAVENOUS PRN
Status: DISCONTINUED | OUTPATIENT
Start: 2019-09-18 | End: 2019-09-18 | Stop reason: SDUPTHER

## 2019-09-18 RX ORDER — LIDOCAINE HYDROCHLORIDE 10 MG/ML
INJECTION, SOLUTION EPIDURAL; INFILTRATION; INTRACAUDAL; PERINEURAL PRN
Status: DISCONTINUED | OUTPATIENT
Start: 2019-09-18 | End: 2019-09-18 | Stop reason: SDUPTHER

## 2019-09-18 RX ORDER — HEPARIN SODIUM 5000 [USP'U]/ML
5000 INJECTION, SOLUTION INTRAVENOUS; SUBCUTANEOUS ONCE
Status: COMPLETED | OUTPATIENT
Start: 2019-09-18 | End: 2019-09-18

## 2019-09-18 RX ORDER — SODIUM CHLORIDE, SODIUM LACTATE, POTASSIUM CHLORIDE, CALCIUM CHLORIDE 600; 310; 30; 20 MG/100ML; MG/100ML; MG/100ML; MG/100ML
INJECTION, SOLUTION INTRAVENOUS CONTINUOUS PRN
Status: DISCONTINUED | OUTPATIENT
Start: 2019-09-18 | End: 2019-09-18 | Stop reason: SDUPTHER

## 2019-09-18 RX ORDER — NICOTINE POLACRILEX 4 MG
15 LOZENGE BUCCAL PRN
Status: DISCONTINUED | OUTPATIENT
Start: 2019-09-18 | End: 2019-09-19 | Stop reason: HOSPADM

## 2019-09-18 RX ORDER — NEOSTIGMINE METHYLSULFATE 5 MG/5 ML
SYRINGE (ML) INTRAVENOUS PRN
Status: DISCONTINUED | OUTPATIENT
Start: 2019-09-18 | End: 2019-09-18 | Stop reason: SDUPTHER

## 2019-09-18 RX ORDER — FENTANYL CITRATE 50 UG/ML
INJECTION, SOLUTION INTRAMUSCULAR; INTRAVENOUS PRN
Status: DISCONTINUED | OUTPATIENT
Start: 2019-09-18 | End: 2019-09-18 | Stop reason: SDUPTHER

## 2019-09-18 RX ORDER — MORPHINE SULFATE 2 MG/ML
2 INJECTION, SOLUTION INTRAMUSCULAR; INTRAVENOUS EVERY 5 MIN PRN
Status: DISCONTINUED | OUTPATIENT
Start: 2019-09-18 | End: 2019-09-18 | Stop reason: HOSPADM

## 2019-09-18 RX ORDER — GLIPIZIDE 5 MG/1
5 TABLET ORAL DAILY
Status: DISCONTINUED | OUTPATIENT
Start: 2019-09-18 | End: 2019-09-18

## 2019-09-18 RX ORDER — GLIPIZIDE 5 MG/1
5 TABLET ORAL DAILY
Status: DISCONTINUED | OUTPATIENT
Start: 2019-09-19 | End: 2019-09-18

## 2019-09-18 RX ORDER — PROPOFOL 10 MG/ML
INJECTION, EMULSION INTRAVENOUS PRN
Status: DISCONTINUED | OUTPATIENT
Start: 2019-09-18 | End: 2019-09-18 | Stop reason: SDUPTHER

## 2019-09-18 RX ORDER — METOPROLOL TARTRATE 50 MG/1
50 TABLET, FILM COATED ORAL DAILY
Status: DISCONTINUED | OUTPATIENT
Start: 2019-09-19 | End: 2019-09-18

## 2019-09-18 RX ORDER — OXYCODONE HYDROCHLORIDE 5 MG/1
10 TABLET ORAL EVERY 4 HOURS PRN
Status: DISCONTINUED | OUTPATIENT
Start: 2019-09-18 | End: 2019-09-19 | Stop reason: HOSPADM

## 2019-09-18 RX ORDER — AMLODIPINE BESYLATE 5 MG/1
5 TABLET ORAL DAILY
Status: DISCONTINUED | OUTPATIENT
Start: 2019-09-19 | End: 2019-09-18

## 2019-09-18 RX ORDER — MORPHINE SULFATE 2 MG/ML
2 INJECTION, SOLUTION INTRAMUSCULAR; INTRAVENOUS
Status: DISCONTINUED | OUTPATIENT
Start: 2019-09-18 | End: 2019-09-19

## 2019-09-18 RX ORDER — ROCURONIUM BROMIDE 10 MG/ML
INJECTION, SOLUTION INTRAVENOUS PRN
Status: DISCONTINUED | OUTPATIENT
Start: 2019-09-18 | End: 2019-09-18 | Stop reason: SDUPTHER

## 2019-09-18 RX ORDER — SODIUM CHLORIDE, SODIUM LACTATE, POTASSIUM CHLORIDE, CALCIUM CHLORIDE 600; 310; 30; 20 MG/100ML; MG/100ML; MG/100ML; MG/100ML
1000 INJECTION, SOLUTION INTRAVENOUS CONTINUOUS
Status: DISCONTINUED | OUTPATIENT
Start: 2019-09-18 | End: 2019-09-18

## 2019-09-18 RX ORDER — FENTANYL CITRATE 50 UG/ML
25 INJECTION, SOLUTION INTRAMUSCULAR; INTRAVENOUS EVERY 5 MIN PRN
Status: DISCONTINUED | OUTPATIENT
Start: 2019-09-18 | End: 2019-09-18 | Stop reason: HOSPADM

## 2019-09-18 RX ORDER — DEXTROSE MONOHYDRATE 25 G/50ML
12.5 INJECTION, SOLUTION INTRAVENOUS PRN
Status: DISCONTINUED | OUTPATIENT
Start: 2019-09-18 | End: 2019-09-19 | Stop reason: HOSPADM

## 2019-09-18 RX ORDER — DEXTROSE MONOHYDRATE 50 MG/ML
100 INJECTION, SOLUTION INTRAVENOUS PRN
Status: DISCONTINUED | OUTPATIENT
Start: 2019-09-18 | End: 2019-09-19 | Stop reason: HOSPADM

## 2019-09-18 RX ORDER — SODIUM CHLORIDE 0.9 % (FLUSH) 0.9 %
10 SYRINGE (ML) INJECTION EVERY 12 HOURS SCHEDULED
Status: DISCONTINUED | OUTPATIENT
Start: 2019-09-18 | End: 2019-09-19 | Stop reason: HOSPADM

## 2019-09-18 RX ORDER — SODIUM CHLORIDE 0.9 % (FLUSH) 0.9 %
10 SYRINGE (ML) INJECTION PRN
Status: DISCONTINUED | OUTPATIENT
Start: 2019-09-18 | End: 2019-09-19 | Stop reason: HOSPADM

## 2019-09-18 RX ORDER — ONDANSETRON 2 MG/ML
4 INJECTION INTRAMUSCULAR; INTRAVENOUS EVERY 6 HOURS PRN
Status: DISCONTINUED | OUTPATIENT
Start: 2019-09-18 | End: 2019-09-19 | Stop reason: HOSPADM

## 2019-09-18 RX ORDER — ONDANSETRON 2 MG/ML
INJECTION INTRAMUSCULAR; INTRAVENOUS PRN
Status: DISCONTINUED | OUTPATIENT
Start: 2019-09-18 | End: 2019-09-18 | Stop reason: SDUPTHER

## 2019-09-18 RX ORDER — MORPHINE SULFATE 4 MG/ML
4 INJECTION, SOLUTION INTRAMUSCULAR; INTRAVENOUS
Status: DISCONTINUED | OUTPATIENT
Start: 2019-09-18 | End: 2019-09-19

## 2019-09-18 RX ORDER — OXYCODONE HYDROCHLORIDE 5 MG/1
5 TABLET ORAL EVERY 4 HOURS PRN
Status: DISCONTINUED | OUTPATIENT
Start: 2019-09-18 | End: 2019-09-19 | Stop reason: HOSPADM

## 2019-09-18 RX ORDER — GLIPIZIDE 5 MG/1
5 TABLET ORAL
Status: DISCONTINUED | OUTPATIENT
Start: 2019-09-19 | End: 2019-09-19 | Stop reason: HOSPADM

## 2019-09-18 RX ORDER — DEXAMETHASONE SODIUM PHOSPHATE 10 MG/ML
INJECTION INTRAMUSCULAR; INTRAVENOUS PRN
Status: DISCONTINUED | OUTPATIENT
Start: 2019-09-18 | End: 2019-09-18 | Stop reason: SDUPTHER

## 2019-09-18 RX ORDER — LISINOPRIL 20 MG/1
20 TABLET ORAL DAILY
Status: DISCONTINUED | OUTPATIENT
Start: 2019-09-19 | End: 2019-09-19 | Stop reason: HOSPADM

## 2019-09-18 RX ORDER — ACETAMINOPHEN 325 MG/1
650 TABLET ORAL EVERY 6 HOURS
Status: DISCONTINUED | OUTPATIENT
Start: 2019-09-18 | End: 2019-09-19 | Stop reason: HOSPADM

## 2019-09-18 RX ORDER — OXYCODONE HYDROCHLORIDE AND ACETAMINOPHEN 5; 325 MG/1; MG/1
1 TABLET ORAL PRN
Status: DISCONTINUED | OUTPATIENT
Start: 2019-09-18 | End: 2019-09-18 | Stop reason: HOSPADM

## 2019-09-18 RX ORDER — FENTANYL CITRATE 50 UG/ML
50 INJECTION, SOLUTION INTRAMUSCULAR; INTRAVENOUS EVERY 5 MIN PRN
Status: DISCONTINUED | OUTPATIENT
Start: 2019-09-18 | End: 2019-09-18 | Stop reason: HOSPADM

## 2019-09-18 RX ORDER — OXYCODONE HYDROCHLORIDE AND ACETAMINOPHEN 5; 325 MG/1; MG/1
2 TABLET ORAL PRN
Status: DISCONTINUED | OUTPATIENT
Start: 2019-09-18 | End: 2019-09-18 | Stop reason: HOSPADM

## 2019-09-18 RX ORDER — MAGNESIUM HYDROXIDE 1200 MG/15ML
LIQUID ORAL CONTINUOUS PRN
Status: COMPLETED | OUTPATIENT
Start: 2019-09-18 | End: 2019-09-18

## 2019-09-18 RX ORDER — GLYCOPYRROLATE 1 MG/5 ML
SYRINGE (ML) INTRAVENOUS PRN
Status: DISCONTINUED | OUTPATIENT
Start: 2019-09-18 | End: 2019-09-18 | Stop reason: SDUPTHER

## 2019-09-18 RX ORDER — MEPERIDINE HYDROCHLORIDE 50 MG/ML
12.5 INJECTION INTRAMUSCULAR; INTRAVENOUS; SUBCUTANEOUS EVERY 5 MIN PRN
Status: DISCONTINUED | OUTPATIENT
Start: 2019-09-18 | End: 2019-09-18 | Stop reason: HOSPADM

## 2019-09-18 RX ORDER — DOCUSATE SODIUM 100 MG/1
100 CAPSULE, LIQUID FILLED ORAL 2 TIMES DAILY
Status: DISCONTINUED | OUTPATIENT
Start: 2019-09-18 | End: 2019-09-19 | Stop reason: HOSPADM

## 2019-09-18 RX ORDER — LABETALOL HYDROCHLORIDE 5 MG/ML
5 INJECTION, SOLUTION INTRAVENOUS EVERY 10 MIN PRN
Status: DISCONTINUED | OUTPATIENT
Start: 2019-09-18 | End: 2019-09-18 | Stop reason: HOSPADM

## 2019-09-18 RX ORDER — AMLODIPINE BESYLATE 5 MG/1
5 TABLET ORAL DAILY
Status: DISCONTINUED | OUTPATIENT
Start: 2019-09-19 | End: 2019-09-19 | Stop reason: HOSPADM

## 2019-09-18 RX ORDER — HYDRALAZINE HYDROCHLORIDE 20 MG/ML
5 INJECTION INTRAMUSCULAR; INTRAVENOUS EVERY 10 MIN PRN
Status: DISCONTINUED | OUTPATIENT
Start: 2019-09-18 | End: 2019-09-18 | Stop reason: HOSPADM

## 2019-09-18 RX ORDER — DIPHENHYDRAMINE HYDROCHLORIDE 50 MG/ML
12.5 INJECTION INTRAMUSCULAR; INTRAVENOUS
Status: DISCONTINUED | OUTPATIENT
Start: 2019-09-18 | End: 2019-09-18 | Stop reason: HOSPADM

## 2019-09-18 RX ADMIN — FENTANYL CITRATE 50 MCG: 50 INJECTION, SOLUTION INTRAMUSCULAR; INTRAVENOUS at 08:16

## 2019-09-18 RX ADMIN — ROCURONIUM BROMIDE 50 MG: 10 INJECTION INTRAVENOUS at 07:42

## 2019-09-18 RX ADMIN — PHENYLEPHRINE HYDROCHLORIDE 100 MCG: 10 INJECTION INTRAVENOUS at 08:33

## 2019-09-18 RX ADMIN — Medication 0.4 MG: at 09:25

## 2019-09-18 RX ADMIN — ONDANSETRON 4 MG: 2 INJECTION, SOLUTION INTRAMUSCULAR; INTRAVENOUS at 09:35

## 2019-09-18 RX ADMIN — METOPROLOL TARTRATE 50 MG: 50 TABLET, FILM COATED ORAL at 21:17

## 2019-09-18 RX ADMIN — HYDRALAZINE HYDROCHLORIDE 5 MG: 20 INJECTION INTRAMUSCULAR; INTRAVENOUS at 10:31

## 2019-09-18 RX ADMIN — PHENYLEPHRINE HYDROCHLORIDE 100 MCG: 10 INJECTION INTRAVENOUS at 09:26

## 2019-09-18 RX ADMIN — Medication 2 G: at 07:57

## 2019-09-18 RX ADMIN — SODIUM CHLORIDE, POTASSIUM CHLORIDE, SODIUM LACTATE AND CALCIUM CHLORIDE: 600; 310; 30; 20 INJECTION, SOLUTION INTRAVENOUS at 07:44

## 2019-09-18 RX ADMIN — DEXAMETHASONE SODIUM PHOSPHATE 10 MG: 10 INJECTION INTRAMUSCULAR; INTRAVENOUS at 07:54

## 2019-09-18 RX ADMIN — DEXTROSE MONOHYDRATE 2 G: 50 INJECTION, SOLUTION INTRAVENOUS at 15:44

## 2019-09-18 RX ADMIN — SODIUM CHLORIDE, PRESERVATIVE FREE 10 ML: 5 INJECTION INTRAVENOUS at 21:00

## 2019-09-18 RX ADMIN — Medication 4 MG: at 09:25

## 2019-09-18 RX ADMIN — PHENYLEPHRINE HYDROCHLORIDE 100 MCG: 10 INJECTION INTRAVENOUS at 07:55

## 2019-09-18 RX ADMIN — HYDRALAZINE HYDROCHLORIDE 5 MG: 20 INJECTION INTRAMUSCULAR; INTRAVENOUS at 10:15

## 2019-09-18 RX ADMIN — ROCURONIUM BROMIDE 15 MG: 10 INJECTION INTRAVENOUS at 09:24

## 2019-09-18 RX ADMIN — SODIUM CHLORIDE: 9 INJECTION, SOLUTION INTRAVENOUS at 15:44

## 2019-09-18 RX ADMIN — LIDOCAINE HYDROCHLORIDE 50 MG: 10 INJECTION, SOLUTION EPIDURAL; INFILTRATION; INTRACAUDAL; PERINEURAL at 07:42

## 2019-09-18 RX ADMIN — HEPARIN SODIUM 5000 UNITS: 5000 INJECTION, SOLUTION INTRAVENOUS; SUBCUTANEOUS at 07:13

## 2019-09-18 RX ADMIN — SODIUM CHLORIDE, POTASSIUM CHLORIDE, SODIUM LACTATE AND CALCIUM CHLORIDE: 600; 310; 30; 20 INJECTION, SOLUTION INTRAVENOUS at 07:38

## 2019-09-18 RX ADMIN — Medication 0.2 MG: at 07:54

## 2019-09-18 RX ADMIN — INSULIN LISPRO 4 UNITS: 100 INJECTION, SOLUTION INTRAVENOUS; SUBCUTANEOUS at 15:03

## 2019-09-18 RX ADMIN — FENTANYL CITRATE 100 MCG: 50 INJECTION, SOLUTION INTRAMUSCULAR; INTRAVENOUS at 07:42

## 2019-09-18 RX ADMIN — Medication 0.4 MG: at 09:55

## 2019-09-18 RX ADMIN — PROPOFOL 150 MG: 10 INJECTION, EMULSION INTRAVENOUS at 07:42

## 2019-09-18 RX ADMIN — SODIUM CHLORIDE, POTASSIUM CHLORIDE, SODIUM LACTATE AND CALCIUM CHLORIDE 1000 ML: 600; 310; 30; 20 INJECTION, SOLUTION INTRAVENOUS at 07:07

## 2019-09-18 RX ADMIN — ROCURONIUM BROMIDE 15 MG: 10 INJECTION INTRAVENOUS at 08:43

## 2019-09-18 RX ADMIN — INSULIN LISPRO 1 UNITS: 100 INJECTION, SOLUTION INTRAVENOUS; SUBCUTANEOUS at 21:20

## 2019-09-18 ASSESSMENT — PULMONARY FUNCTION TESTS
PIF_VALUE: 15
PIF_VALUE: 31
PIF_VALUE: 30
PIF_VALUE: 31
PIF_VALUE: 20
PIF_VALUE: 28
PIF_VALUE: 18
PIF_VALUE: 14
PIF_VALUE: 31
PIF_VALUE: 17
PIF_VALUE: 31
PIF_VALUE: 15
PIF_VALUE: 31
PIF_VALUE: 18
PIF_VALUE: 1
PIF_VALUE: 16
PIF_VALUE: 32
PIF_VALUE: 18
PIF_VALUE: 31
PIF_VALUE: 20
PIF_VALUE: 18
PIF_VALUE: 31
PIF_VALUE: 30
PIF_VALUE: 30
PIF_VALUE: 28
PIF_VALUE: 31
PIF_VALUE: 30
PIF_VALUE: 23
PIF_VALUE: 31
PIF_VALUE: 16
PIF_VALUE: 15
PIF_VALUE: 31
PIF_VALUE: 14
PIF_VALUE: 30
PIF_VALUE: 0
PIF_VALUE: 30
PIF_VALUE: 15
PIF_VALUE: 30
PIF_VALUE: 29
PIF_VALUE: 31
PIF_VALUE: 31
PIF_VALUE: 30
PIF_VALUE: 31
PIF_VALUE: 16
PIF_VALUE: 31
PIF_VALUE: 15
PIF_VALUE: 31
PIF_VALUE: 30
PIF_VALUE: 14
PIF_VALUE: 22
PIF_VALUE: 30
PIF_VALUE: 31
PIF_VALUE: 13
PIF_VALUE: 31
PIF_VALUE: 30
PIF_VALUE: 31
PIF_VALUE: 29
PIF_VALUE: 31
PIF_VALUE: 30
PIF_VALUE: 14
PIF_VALUE: 30
PIF_VALUE: 31
PIF_VALUE: 4
PIF_VALUE: 17
PIF_VALUE: 29
PIF_VALUE: 22
PIF_VALUE: 0
PIF_VALUE: 30
PIF_VALUE: 31
PIF_VALUE: 15
PIF_VALUE: 16
PIF_VALUE: 6
PIF_VALUE: 31
PIF_VALUE: 30
PIF_VALUE: 31
PIF_VALUE: 32
PIF_VALUE: 31
PIF_VALUE: 30
PIF_VALUE: 16
PIF_VALUE: 15
PIF_VALUE: 14
PIF_VALUE: 1
PIF_VALUE: 3
PIF_VALUE: 31
PIF_VALUE: 31
PIF_VALUE: 32
PIF_VALUE: 18
PIF_VALUE: 31
PIF_VALUE: 30
PIF_VALUE: 17
PIF_VALUE: 21
PIF_VALUE: 32
PIF_VALUE: 27
PIF_VALUE: 30
PIF_VALUE: 16
PIF_VALUE: 1
PIF_VALUE: 21
PIF_VALUE: 14
PIF_VALUE: 19
PIF_VALUE: 31
PIF_VALUE: 16
PIF_VALUE: 15
PIF_VALUE: 30
PIF_VALUE: 5
PIF_VALUE: 31
PIF_VALUE: 22
PIF_VALUE: 32
PIF_VALUE: 31
PIF_VALUE: 30
PIF_VALUE: 31
PIF_VALUE: 31
PIF_VALUE: 17
PIF_VALUE: 15
PIF_VALUE: 0
PIF_VALUE: 31
PIF_VALUE: 15
PIF_VALUE: 30
PIF_VALUE: 31
PIF_VALUE: 23
PIF_VALUE: 19
PIF_VALUE: 22
PIF_VALUE: 13
PIF_VALUE: 31
PIF_VALUE: 31
PIF_VALUE: 18
PIF_VALUE: 31
PIF_VALUE: 18
PIF_VALUE: 20
PIF_VALUE: 15
PIF_VALUE: 31
PIF_VALUE: 22

## 2019-09-18 ASSESSMENT — PAIN SCALES - GENERAL
PAINLEVEL_OUTOF10: 0

## 2019-09-18 ASSESSMENT — PAIN - FUNCTIONAL ASSESSMENT: PAIN_FUNCTIONAL_ASSESSMENT: 0-10

## 2019-09-18 NOTE — ANESTHESIA PRE PROCEDURE
3 FB Dental:    (+) lower dentures and upper dentures      Pulmonary:Negative Pulmonary ROS breath sounds clear to auscultation                             Cardiovascular:    (+) hypertension:,         Rhythm: regular  Rate: normal                    Neuro/Psych:   Negative Neuro/Psych ROS              GI/Hepatic/Renal:            ROS comment: Prostate cancer. Endo/Other:    (+) Diabetes, . Abdominal:           Vascular: negative vascular ROS. Anesthesia Plan      general     ASA 2     (HTN  DM)  Induction: intravenous. MIPS: Postoperative opioids intended and Prophylactic antiemetics administered. Anesthetic plan and risks discussed with patient. Use of blood products discussed with patient whom.                    Yennifer Elizalde MD   9/18/2019

## 2019-09-18 NOTE — ANESTHESIA POSTPROCEDURE EVALUATION
Department of Anesthesiology  Postprocedure Note    Patient: Katheryn Amor  MRN: [id number]  YOB: 1950  Date of evaluation: 9/18/2019  Time:  12:08 PM     Procedure Summary     Date:  09/18/19 Room / Location:  [address] [place] [address] OR    Anesthesia Start:  3139 Anesthesia Stop:  6041    Procedure:  XI PROSTATECTOMY LAPAROSCOPIC ROBOTIC,  PELVIC LYMPH NODE DISSECTION (N/A ) Diagnosis:  (PROSTATE CANCER)    Surgeon:  Glenys Galeas MD Responsible Provider:  Celeste Conley MD    Anesthesia Type:  general ASA Status:  2          Anesthesia Type: general    Kellie Phase I: Kellie Score: 8    Kellie Phase II:      Last vitals: Reviewed and per EMR flowsheets.        Anesthesia Post Evaluation    Patient location during evaluation: PACU  Patient participation: complete - patient participated  Level of consciousness: awake  Pain score: 1  Airway patency: patent  Nausea & Vomiting: no vomiting  Complications: no  Cardiovascular status: hemodynamically stable  Respiratory status: acceptable  Hydration status: stable

## 2019-09-19 VITALS
DIASTOLIC BLOOD PRESSURE: 70 MMHG | HEIGHT: 67 IN | SYSTOLIC BLOOD PRESSURE: 129 MMHG | HEART RATE: 71 BPM | RESPIRATION RATE: 16 BRPM | BODY MASS INDEX: 23.23 KG/M2 | WEIGHT: 148 LBS | TEMPERATURE: 98.7 F | OXYGEN SATURATION: 97 %

## 2019-09-19 LAB
ANION GAP SERPL CALCULATED.3IONS-SCNC: 14 MMOL/L (ref 9–17)
BUN BLDV-MCNC: 24 MG/DL (ref 8–23)
BUN/CREAT BLD: ABNORMAL (ref 9–20)
CALCIUM SERPL-MCNC: 8.2 MG/DL (ref 8.6–10.4)
CHLORIDE BLD-SCNC: 101 MMOL/L (ref 98–107)
CO2: 22 MMOL/L (ref 20–31)
CREAT SERPL-MCNC: 1.04 MG/DL (ref 0.7–1.2)
CULTURE: NO GROWTH
GFR AFRICAN AMERICAN: >60 ML/MIN
GFR NON-AFRICAN AMERICAN: >60 ML/MIN
GFR SERPL CREATININE-BSD FRML MDRD: ABNORMAL ML/MIN/{1.73_M2}
GFR SERPL CREATININE-BSD FRML MDRD: ABNORMAL ML/MIN/{1.73_M2}
GLUCOSE BLD-MCNC: 210 MG/DL (ref 75–110)
GLUCOSE BLD-MCNC: 212 MG/DL (ref 70–99)
GLUCOSE BLD-MCNC: 252 MG/DL (ref 75–110)
HCT VFR BLD CALC: 36.7 % (ref 40.7–50.3)
HEMOGLOBIN: 11.6 G/DL (ref 13–17)
Lab: NORMAL
MCH RBC QN AUTO: 26.6 PG (ref 25.2–33.5)
MCHC RBC AUTO-ENTMCNC: 31.6 G/DL (ref 28.4–34.8)
MCV RBC AUTO: 84.2 FL (ref 82.6–102.9)
NRBC AUTOMATED: 0 PER 100 WBC
PDW BLD-RTO: 13.1 % (ref 11.8–14.4)
PLATELET # BLD: 227 K/UL (ref 138–453)
PMV BLD AUTO: 10.5 FL (ref 8.1–13.5)
POTASSIUM SERPL-SCNC: 4.2 MMOL/L (ref 3.7–5.3)
RBC # BLD: 4.36 M/UL (ref 4.21–5.77)
SODIUM BLD-SCNC: 137 MMOL/L (ref 135–144)
SPECIMEN DESCRIPTION: NORMAL
SURGICAL PATHOLOGY REPORT: NORMAL
WBC # BLD: 11.2 K/UL (ref 3.5–11.3)

## 2019-09-19 PROCEDURE — 80048 BASIC METABOLIC PNL TOTAL CA: CPT

## 2019-09-19 PROCEDURE — 2580000003 HC RX 258: Performed by: STUDENT IN AN ORGANIZED HEALTH CARE EDUCATION/TRAINING PROGRAM

## 2019-09-19 PROCEDURE — 36415 COLL VENOUS BLD VENIPUNCTURE: CPT

## 2019-09-19 PROCEDURE — 82947 ASSAY GLUCOSE BLOOD QUANT: CPT

## 2019-09-19 PROCEDURE — 6370000000 HC RX 637 (ALT 250 FOR IP): Performed by: STUDENT IN AN ORGANIZED HEALTH CARE EDUCATION/TRAINING PROGRAM

## 2019-09-19 PROCEDURE — 6360000002 HC RX W HCPCS: Performed by: STUDENT IN AN ORGANIZED HEALTH CARE EDUCATION/TRAINING PROGRAM

## 2019-09-19 PROCEDURE — G0378 HOSPITAL OBSERVATION PER HR: HCPCS

## 2019-09-19 PROCEDURE — 85027 COMPLETE CBC AUTOMATED: CPT

## 2019-09-19 RX ORDER — CIPROFLOXACIN 500 MG/1
500 TABLET, FILM COATED ORAL 2 TIMES DAILY
Qty: 6 TABLET | Refills: 0 | Status: SHIPPED | OUTPATIENT
Start: 2019-09-19 | End: 2019-09-22

## 2019-09-19 RX ORDER — PSEUDOEPHEDRINE HCL 30 MG
100 TABLET ORAL 2 TIMES DAILY
Qty: 60 CAPSULE | Refills: 1 | Status: SHIPPED | OUTPATIENT
Start: 2019-09-19

## 2019-09-19 RX ORDER — HYDROCODONE BITARTRATE AND ACETAMINOPHEN 5; 325 MG/1; MG/1
1 TABLET ORAL EVERY 6 HOURS PRN
Qty: 20 TABLET | Refills: 0 | Status: SHIPPED | OUTPATIENT
Start: 2019-09-19 | End: 2019-09-24

## 2019-09-19 RX ADMIN — DEXTROSE MONOHYDRATE 2 G: 50 INJECTION, SOLUTION INTRAVENOUS at 00:48

## 2019-09-19 RX ADMIN — INSULIN LISPRO 2 UNITS: 100 INJECTION, SOLUTION INTRAVENOUS; SUBCUTANEOUS at 08:54

## 2019-09-19 RX ADMIN — DOCUSATE SODIUM 100 MG: 100 CAPSULE, LIQUID FILLED ORAL at 08:53

## 2019-09-19 RX ADMIN — INSULIN LISPRO 3 UNITS: 100 INJECTION, SOLUTION INTRAVENOUS; SUBCUTANEOUS at 12:05

## 2019-09-19 RX ADMIN — GLIPIZIDE 5 MG: 5 TABLET ORAL at 06:48

## 2019-09-19 RX ADMIN — ACETAMINOPHEN 650 MG: 325 TABLET ORAL at 06:55

## 2019-09-19 ASSESSMENT — PAIN SCALES - GENERAL: PAINLEVEL_OUTOF10: 0

## 2019-10-01 ENCOUNTER — TELEPHONE (OUTPATIENT)
Dept: RADIATION ONCOLOGY | Facility: MEDICAL CENTER | Age: 69
End: 2019-10-01

## 2019-10-10 ENCOUNTER — HOSPITAL ENCOUNTER (OUTPATIENT)
Dept: RADIATION ONCOLOGY | Facility: MEDICAL CENTER | Age: 69
Discharge: HOME | End: 2019-10-10
Payer: MEDICARE

## 2019-10-10 VITALS
OXYGEN SATURATION: 100 % | BODY MASS INDEX: 22.31 KG/M2 | HEIGHT: 67 IN | DIASTOLIC BLOOD PRESSURE: 88 MMHG | HEART RATE: 64 BPM | SYSTOLIC BLOOD PRESSURE: 148 MMHG | WEIGHT: 142.13 LBS | RESPIRATION RATE: 16 BRPM | TEMPERATURE: 97.7 F

## 2019-10-10 PROCEDURE — 99213 OFFICE O/P EST LOW 20 MIN: CPT | Performed by: RADIOLOGY

## 2019-11-11 ENCOUNTER — HOSPITAL ENCOUNTER (OUTPATIENT)
Age: 69
Setting detail: SPECIMEN
Discharge: HOME | End: 2019-11-11
Payer: MEDICARE

## 2019-11-13 LAB
CULTURE: ABNORMAL
Lab: ABNORMAL
SPECIMEN DESCRIPTION: ABNORMAL

## 2019-12-12 ENCOUNTER — HOSPITAL ENCOUNTER (OUTPATIENT)
Dept: RADIATION ONCOLOGY | Facility: MEDICAL CENTER | Age: 69
Discharge: HOME | End: 2019-12-12
Payer: MEDICARE

## 2019-12-12 VITALS
BODY MASS INDEX: 22.34 KG/M2 | DIASTOLIC BLOOD PRESSURE: 92 MMHG | WEIGHT: 140.5 LBS | HEART RATE: 60 BPM | OXYGEN SATURATION: 99 % | RESPIRATION RATE: 15 BRPM | SYSTOLIC BLOOD PRESSURE: 156 MMHG | TEMPERATURE: 98.4 F

## 2019-12-12 PROCEDURE — 99212 OFFICE O/P EST SF 10 MIN: CPT | Performed by: RADIOLOGY

## 2019-12-13 DIAGNOSIS — C61 PROSTATE CANCER (HCC): Primary | ICD-10-CM

## 2019-12-19 ENCOUNTER — HOSPITAL ENCOUNTER (OUTPATIENT)
Dept: RADIATION ONCOLOGY | Facility: MEDICAL CENTER | Age: 69
Discharge: HOME | End: 2019-12-19
Payer: MEDICARE

## 2019-12-19 ENCOUNTER — TELEPHONE (OUTPATIENT)
Dept: RADIATION ONCOLOGY | Facility: MEDICAL CENTER | Age: 69
End: 2019-12-19

## 2019-12-19 ENCOUNTER — TELEPHONE (OUTPATIENT)
Dept: ONCOLOGY | Age: 69
End: 2019-12-19

## 2019-12-19 PROCEDURE — 77334 RADIATION TREATMENT AID(S): CPT

## 2020-01-02 ENCOUNTER — HOSPITAL ENCOUNTER (OUTPATIENT)
Dept: RADIATION ONCOLOGY | Facility: MEDICAL CENTER | Age: 70
Discharge: HOME | End: 2020-01-02
Payer: MEDICARE

## 2020-01-02 PROCEDURE — 77301 RADIOTHERAPY DOSE PLAN IMRT: CPT

## 2020-01-02 PROCEDURE — 77300 RADIATION THERAPY DOSE PLAN: CPT

## 2020-01-02 PROCEDURE — 77338 DESIGN MLC DEVICE FOR IMRT: CPT

## 2020-01-08 ENCOUNTER — HOSPITAL ENCOUNTER (OUTPATIENT)
Dept: RADIATION ONCOLOGY | Facility: MEDICAL CENTER | Age: 70
End: 2020-01-08
Payer: MEDICARE

## 2020-01-08 ENCOUNTER — APPOINTMENT (OUTPATIENT)
Dept: RADIATION ONCOLOGY | Facility: MEDICAL CENTER | Age: 70
End: 2020-01-08
Payer: MEDICARE

## 2020-01-09 ENCOUNTER — APPOINTMENT (OUTPATIENT)
Dept: RADIATION ONCOLOGY | Facility: MEDICAL CENTER | Age: 70
End: 2020-01-09
Payer: MEDICARE

## 2020-01-10 ENCOUNTER — APPOINTMENT (OUTPATIENT)
Dept: RADIATION ONCOLOGY | Facility: MEDICAL CENTER | Age: 70
End: 2020-01-10
Payer: MEDICARE

## 2020-01-13 ENCOUNTER — HOSPITAL ENCOUNTER (OUTPATIENT)
Dept: RADIATION ONCOLOGY | Facility: MEDICAL CENTER | Age: 70
Discharge: HOME | End: 2020-01-13
Payer: MEDICARE

## 2020-01-13 PROCEDURE — 77385 HC NTSTY MODUL RAD TX DLVR SMPL: CPT | Performed by: RADIOLOGY

## 2020-01-13 PROCEDURE — 77385 HC NTSTY MODUL RAD TX DLVR SMPL: CPT

## 2020-01-14 ENCOUNTER — HOSPITAL ENCOUNTER (OUTPATIENT)
Dept: RADIATION ONCOLOGY | Facility: MEDICAL CENTER | Age: 70
Discharge: HOME | End: 2020-01-14
Payer: MEDICARE

## 2020-01-14 VITALS
TEMPERATURE: 98.4 F | SYSTOLIC BLOOD PRESSURE: 143 MMHG | HEART RATE: 68 BPM | RESPIRATION RATE: 16 BRPM | OXYGEN SATURATION: 99 % | WEIGHT: 143.5 LBS | BODY MASS INDEX: 22.81 KG/M2 | DIASTOLIC BLOOD PRESSURE: 94 MMHG

## 2020-01-14 PROCEDURE — 77385 HC NTSTY MODUL RAD TX DLVR SMPL: CPT

## 2020-01-14 NOTE — PROGRESS NOTES
Mick Martin  1/14/2020  Wt Readings from Last 3 Encounters:   01/14/20 143 lb 8 oz (65.1 kg)   12/12/19 140 lb 8 oz (63.7 kg)   10/10/19 142 lb 2 oz (64.5 kg)     Body mass index is 22.81 kg/m². Pt here for OTV. Patient denies any pain. Dr. Velez Push to eval.    Treatment Area:prostate    Patient was seen today for weekly visit. Comfort Alteration    Fatigue: None    Nutritional Alteration  Anorexia: No  Nausea: No  Vomiting: No     Elimination Alterations  Constipation: no  Diarrhea:  no  Urinary Frequency/Urgency: Yes  Urinary Retention: No  Dysuria: No  Urinary Incontinence: Yes  Proctitis: No  Nocturia: No #/night: 0     Emotional  Coping: effective    Sexuality Alteration  absent    Skin Alteration   Sensation:none    Radiation Dermatitis:  Intact [x]     Erythema  []     Discoloration  []     Rash []     Dry desquamation  []     Moist desquamation []           Injury, potential bleeding or infection: none    Lab Results   Component Value Date    WBC 11.2 09/19/2019     09/19/2019         BP (!) 143/94   Pulse 68   Temp 98.4 °F (36.9 °C) (Oral)   Resp 16   Wt 143 lb 8 oz (65.1 kg)   SpO2 99%   BMI 22.81 kg/m²   Patient Currently in Pain: No               Assessment/Plan: Patient was seen today for weekly visit.       Kenya Paz

## 2020-01-14 NOTE — PROGRESS NOTES
[facility] Oncology  On Treatment Visit (OTV) Note    Diagnosis: 68-year-old man diagnosed with high risk prostate adenocarcinoma (PSA 13 ng/mL, Luke score 4+4, pT3b) status post R0 robotic radical prostatectomy and lymph node dissection on 9/18/2019. Dose Accrued: 380/6840 cGy    S: Patient is tolerating daily set up and treatment delivery without difficulty. Denies new or unusual symptoms. Patient continues to work in full-time capacity. O: BP (!) 143/94   Pulse 68   Temp 98.4 °F (36.9 °C) (Oral)   Resp 16   Wt 143 lb 8 oz (65.1 kg)   SpO2 99%   BMI 22.81 kg/m² . Well-appearing, in no apparent distress, alert and fully oriented, answers questions appropriately. No signs of acute radiation-induced toxicity on physical exam.    IGRT: Set-up images acquired to ensure daily treatment accuracy and precision were reviewed by the physician. A&P: Continue radiotherapy as planned. We reviewed reasonably anticipated side effects in the upcoming weeks. Patient needs to drink more water and have better bladder filling prior to daily treatments, and we talked about how to accomplish that. He verbalized a good understanding. Went over a sample schedule as to when to start drinking water in the morning, and why this is important.     Electronically signed by Azam Darby MD on 1/14/2020 at 9:13 AM

## 2020-01-15 ENCOUNTER — HOSPITAL ENCOUNTER (OUTPATIENT)
Dept: RADIATION ONCOLOGY | Facility: MEDICAL CENTER | Age: 70
Discharge: HOME | End: 2020-01-15
Attending: RADIOLOGY
Payer: MEDICARE

## 2020-01-15 PROCEDURE — 77385 HC NTSTY MODUL RAD TX DLVR SMPL: CPT | Performed by: RADIOLOGY

## 2020-01-16 ENCOUNTER — HOSPITAL ENCOUNTER (OUTPATIENT)
Dept: RADIATION ONCOLOGY | Facility: MEDICAL CENTER | Age: 70
Discharge: HOME | End: 2020-01-16
Attending: RADIOLOGY
Payer: MEDICARE

## 2020-01-16 PROCEDURE — 77385 HC NTSTY MODUL RAD TX DLVR SMPL: CPT

## 2020-01-17 ENCOUNTER — HOSPITAL ENCOUNTER (OUTPATIENT)
Dept: RADIATION ONCOLOGY | Facility: MEDICAL CENTER | Age: 70
Discharge: HOME | End: 2020-01-17
Attending: RADIOLOGY
Payer: MEDICARE

## 2020-01-17 PROCEDURE — 77336 RADIATION PHYSICS CONSULT: CPT | Performed by: RADIOLOGY

## 2020-01-17 PROCEDURE — 77385 HC NTSTY MODUL RAD TX DLVR SMPL: CPT

## 2020-01-21 ENCOUNTER — HOSPITAL ENCOUNTER (OUTPATIENT)
Dept: RADIATION ONCOLOGY | Facility: MEDICAL CENTER | Age: 70
Discharge: HOME | End: 2020-01-21
Payer: MEDICARE

## 2020-01-21 ENCOUNTER — HOSPITAL ENCOUNTER (OUTPATIENT)
Dept: RADIATION ONCOLOGY | Facility: MEDICAL CENTER | Age: 70
Discharge: HOME | End: 2020-01-21
Attending: RADIOLOGY
Payer: MEDICARE

## 2020-01-21 VITALS
TEMPERATURE: 97.7 F | BODY MASS INDEX: 23.37 KG/M2 | OXYGEN SATURATION: 100 % | RESPIRATION RATE: 16 BRPM | HEART RATE: 68 BPM | DIASTOLIC BLOOD PRESSURE: 91 MMHG | SYSTOLIC BLOOD PRESSURE: 139 MMHG | WEIGHT: 147 LBS

## 2020-01-21 PROCEDURE — 77385 HC NTSTY MODUL RAD TX DLVR SMPL: CPT

## 2020-01-21 NOTE — PROGRESS NOTES
[facility] Radiation Oncology  On Treatment Visit (OTV) Note     Diagnosis: 75-year-old man diagnosed with high risk prostate adenocarcinoma (PSA 13 ng/mL, Luke score 4+4, pT3b) status post R0 robotic radical prostatectomy and lymph node dissection on 9/18/2019.        Dose Accrued: 1140/6840 cGy     S: Patient is doing well, same as last week. He is tolerating daily set up and treatment delivery without difficulty. Denies new or unusual symptoms. Patient continues to work in full-time capacity. He wears Depends briefs during work \"just to be sure. \" Nocturia x 1. Denies urinary frequency or urgency during day. Normal bowel movements.     O: BP (!) 139/91   Pulse 68   Temp 97.7 °F (36.5 °C) (Oral)   Resp 16   Wt 147 lb (66.7 kg)   SpO2 100%   BMI 23.37 kg/m²    Well-appearing, in no apparent distress, alert and fully oriented, answers questions appropriately. No signs of acute radiation-induced toxicity on physical exam.     IGRT: Set-up images acquired to ensure daily treatment accuracy and precision were reviewed by the physician.     A&P: Continue radiotherapy as planned. We reviewed reasonably anticipated side effects in the upcoming weeks.       Electronically signed by Tamica Odonnell MD on 1/21/2020 at 9:02 AM

## 2020-01-21 NOTE — PROGRESS NOTES
Aden Gene  1/21/2020  Wt Readings from Last 3 Encounters:   01/21/20 147 lb (66.7 kg)   01/14/20 143 lb 8 oz (65.1 kg)   12/12/19 140 lb 8 oz (63.7 kg)     Body mass index is 23.37 kg/m². PT here for OTV. Patient denies pain, no change from last week. Dr. Jonatan To to kenan.    Treatment Area:prostate    Patient was seen today for weekly visit. Comfort Alteration    Fatigue: None    Nutritional Alteration  Anorexia: No  Nausea: No  Vomiting: No     Elimination Alterations  Constipation: no  Diarrhea:  no  Urinary Frequency/Urgency: Yes  Urinary Retention: No  Dysuria: No  Urinary Incontinence: Yes  Proctitis: No  Nocturia: No #/night: n/a     Emotional  Coping: effective    Sexuality Alteration  absent    Skin Alteration   Sensation:none    Radiation Dermatitis:  Intact [x]     Erythema  []     Discoloration  []     Rash []     Dry desquamation  []     Moist desquamation []           Injury, potential bleeding or infection:none    Lab Results   Component Value Date    WBC 11.2 09/19/2019     09/19/2019         BP (!) 139/91   Pulse 68   Temp 97.7 °F (36.5 °C) (Oral)   Resp 16   Wt 147 lb (66.7 kg)   SpO2 100%   BMI 23.37 kg/m²   Patient Currently in Pain: No               Assessment/Plan: Patient was seen today for weekly visit.       Yoandy Dunne

## 2020-01-22 ENCOUNTER — HOSPITAL ENCOUNTER (OUTPATIENT)
Dept: RADIATION ONCOLOGY | Facility: MEDICAL CENTER | Age: 70
Discharge: HOME | End: 2020-01-22
Attending: RADIOLOGY
Payer: MEDICARE

## 2020-01-22 PROCEDURE — 77385 HC NTSTY MODUL RAD TX DLVR SMPL: CPT | Performed by: RADIOLOGY

## 2020-01-23 ENCOUNTER — HOSPITAL ENCOUNTER (OUTPATIENT)
Dept: RADIATION ONCOLOGY | Facility: MEDICAL CENTER | Age: 70
Discharge: HOME | End: 2020-01-23
Attending: RADIOLOGY
Payer: MEDICARE

## 2020-01-23 PROCEDURE — 77385 HC NTSTY MODUL RAD TX DLVR SMPL: CPT

## 2020-01-24 ENCOUNTER — HOSPITAL ENCOUNTER (OUTPATIENT)
Dept: RADIATION ONCOLOGY | Facility: MEDICAL CENTER | Age: 70
Discharge: HOME | End: 2020-01-24
Attending: RADIOLOGY
Payer: MEDICARE

## 2020-01-24 PROCEDURE — 77385 HC NTSTY MODUL RAD TX DLVR SMPL: CPT | Performed by: RADIOLOGY

## 2020-01-27 ENCOUNTER — HOSPITAL ENCOUNTER (OUTPATIENT)
Dept: RADIATION ONCOLOGY | Facility: MEDICAL CENTER | Age: 70
Discharge: HOME | End: 2020-01-27
Attending: RADIOLOGY
Payer: MEDICARE

## 2020-01-27 PROCEDURE — 77385 HC NTSTY MODUL RAD TX DLVR SMPL: CPT | Performed by: RADIOLOGY

## 2020-01-28 ENCOUNTER — HOSPITAL ENCOUNTER (OUTPATIENT)
Dept: RADIATION ONCOLOGY | Facility: MEDICAL CENTER | Age: 70
Discharge: HOME | End: 2020-01-28
Attending: RADIOLOGY
Payer: MEDICARE

## 2020-01-28 ENCOUNTER — HOSPITAL ENCOUNTER (OUTPATIENT)
Dept: RADIATION ONCOLOGY | Facility: MEDICAL CENTER | Age: 70
Discharge: HOME | End: 2020-01-28
Payer: MEDICARE

## 2020-01-28 VITALS
HEART RATE: 60 BPM | DIASTOLIC BLOOD PRESSURE: 73 MMHG | WEIGHT: 147 LBS | TEMPERATURE: 97.9 F | SYSTOLIC BLOOD PRESSURE: 120 MMHG | OXYGEN SATURATION: 100 % | BODY MASS INDEX: 23.37 KG/M2 | RESPIRATION RATE: 20 BRPM

## 2020-01-28 PROCEDURE — 77385 HC NTSTY MODUL RAD TX DLVR SMPL: CPT

## 2020-01-28 PROCEDURE — 77336 RADIATION PHYSICS CONSULT: CPT

## 2020-01-28 NOTE — PROGRESS NOTES
[facility] Oncology  On Treatment Visit (OTV) Note     Diagnosis: 68year-old man diagnosed with high risk prostate adenocarcinoma (PSA 13 ng/mL, Luke score 4+4, pT3b) status post R0 robotic radical prostatectomy and lymph node dissection on 9/18/2019.        Dose Accrued: 2080/6840 cGy     S: Patient is doing well, same as last week. He is tolerating daily set up and treatment delivery without difficulty.  Denies new or unusual symptoms.  Patient continues to work in full-time capacity. He wears Depends briefs during work \"just to be sure. \" Nocturia x 1. Denies urinary frequency or urgency during day. Normal bowel movements, usually 1x in the evening.     O: /73   Pulse 60   Temp 97.9 °F (36.6 °C) (Oral)   Resp 20   Wt 147 lb (66.7 kg)   SpO2 100%   BMI 23.37 kg/m²    Well-appearing, in no apparent distress, alert and fully oriented, answers questions appropriately.  No signs of acute radiation-induced toxicity on physical exam.     IGRT: Set-up images acquired to ensure daily treatment accuracy and precision were reviewed by the physician.     A&P: Continue radiotherapy as planned.  We reviewed reasonably anticipated side effects in the upcoming weeks. We talked about how to optimize bladder filling.     Electronically signed by David Carver MD on 1/28/2020 at 8:58 AM

## 2020-01-29 ENCOUNTER — HOSPITAL ENCOUNTER (OUTPATIENT)
Dept: RADIATION ONCOLOGY | Facility: MEDICAL CENTER | Age: 70
Discharge: HOME | End: 2020-01-29
Attending: RADIOLOGY
Payer: MEDICARE

## 2020-01-29 PROCEDURE — 77385 HC NTSTY MODUL RAD TX DLVR SMPL: CPT | Performed by: RADIOLOGY

## 2020-01-30 ENCOUNTER — HOSPITAL ENCOUNTER (OUTPATIENT)
Dept: RADIATION ONCOLOGY | Facility: MEDICAL CENTER | Age: 70
Discharge: HOME | End: 2020-01-30
Attending: RADIOLOGY
Payer: MEDICARE

## 2020-01-30 PROCEDURE — 77385 HC NTSTY MODUL RAD TX DLVR SMPL: CPT

## 2020-01-31 ENCOUNTER — HOSPITAL ENCOUNTER (OUTPATIENT)
Dept: RADIATION ONCOLOGY | Facility: MEDICAL CENTER | Age: 70
Discharge: HOME | End: 2020-01-31
Attending: RADIOLOGY
Payer: MEDICARE

## 2020-01-31 PROCEDURE — 77385 HC NTSTY MODUL RAD TX DLVR SMPL: CPT | Performed by: RADIOLOGY

## 2020-01-31 PROCEDURE — 77385 HC NTSTY MODUL RAD TX DLVR SMPL: CPT

## 2020-02-03 ENCOUNTER — HOSPITAL ENCOUNTER (OUTPATIENT)
Dept: RADIATION ONCOLOGY | Facility: MEDICAL CENTER | Age: 70
Discharge: HOME | End: 2020-02-03
Attending: RADIOLOGY
Payer: MEDICARE

## 2020-02-03 PROCEDURE — 77385 HC NTSTY MODUL RAD TX DLVR SMPL: CPT | Performed by: RADIOLOGY

## 2020-02-04 ENCOUNTER — HOSPITAL ENCOUNTER (OUTPATIENT)
Dept: RADIATION ONCOLOGY | Facility: MEDICAL CENTER | Age: 70
Discharge: HOME | End: 2020-02-04
Attending: RADIOLOGY
Payer: MEDICARE

## 2020-02-04 ENCOUNTER — HOSPITAL ENCOUNTER (OUTPATIENT)
Dept: RADIATION ONCOLOGY | Facility: MEDICAL CENTER | Age: 70
Discharge: HOME | End: 2020-02-04
Payer: MEDICARE

## 2020-02-04 VITALS
HEART RATE: 68 BPM | RESPIRATION RATE: 16 BRPM | DIASTOLIC BLOOD PRESSURE: 77 MMHG | TEMPERATURE: 98.2 F | WEIGHT: 148 LBS | SYSTOLIC BLOOD PRESSURE: 123 MMHG | BODY MASS INDEX: 23.53 KG/M2 | OXYGEN SATURATION: 100 %

## 2020-02-04 PROCEDURE — 77385 HC NTSTY MODUL RAD TX DLVR SMPL: CPT

## 2020-02-04 PROCEDURE — 77336 RADIATION PHYSICS CONSULT: CPT | Performed by: RADIOLOGY

## 2020-02-04 NOTE — PROGRESS NOTES
Yousif Carey  2/4/2020  Wt Readings from Last 3 Encounters:   02/04/20 148 lb (67.1 kg)   01/28/20 147 lb (66.7 kg)   01/21/20 147 lb (66.7 kg)     Body mass index is 23.53 kg/m². Pt here for OTV. Patient denies pain or any changes. Dr. Lana Darling to [facility].    Treatment Area:prostate    Patient was seen today for weekly visit. Comfort Alteration    Fatigue: None    Nutritional Alteration  Anorexia: No  Nausea: No  Vomiting: No     Elimination Alterations  Constipation: no  Diarrhea:  no  Urinary Frequency/Urgency: Yes  Urinary Retention: No  Dysuria: No  Urinary Incontinence: Yes  Proctitis: No  Nocturia: No #/night: n/a     Emotional  Coping: effective    Sexuality Alteration  absent    Skin Alteration   Sensation:none    Radiation Dermatitis:  Intact [x]     Erythema  []     Discoloration  []     Rash []     Dry desquamation  []     Moist desquamation []           Injury, potential bleeding or infection: none    Lab Results   Component Value Date    WBC 11.2 09/19/2019     09/19/2019         /77   Pulse 68   Temp 98.2 °F (36.8 °C) (Oral)   Resp 16   Wt 148 lb (67.1 kg)   SpO2 100%   BMI 23.53 kg/m²   Patient Currently in Pain: No               Assessment/Plan: Patient was seen today for weekly visit.       Silverio Mccartney

## 2020-02-04 NOTE — PROGRESS NOTES
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        Dose Accrued: 2980/6840 cGy     S: Patient is doing well, same as last week. He is tolerating daily set up and treatment delivery without difficulty.  Denies new or unusual symptoms.  Patient continues to work in full-time capacity as an . Nocturia x 1. Denies urinary frequency or urgency during day. Normal bowel movements, usually 1x in the evening.     O: /77   Pulse 68   Temp 98.2 °F (36.8 °C) (Oral)   Resp 16   Wt 148 lb (67.1 kg)   SpO2 100%   BMI 23.53 kg/m²    Well-appearing, in no apparent distress, alert and fully oriented, answers questions appropriately.  No signs of acute radiation-induced toxicity on physical exam.     IGRT: Set-up images acquired to ensure daily treatment accuracy and precision were reviewed by the physician.     A&P: Continue radiotherapy as planned.  We reviewed reasonably anticipated side effects in the upcoming weeks.      Electronically signed by Nicolette Saunders MD on 2/4/2020 at 9:13 AM

## 2020-02-05 ENCOUNTER — HOSPITAL ENCOUNTER (OUTPATIENT)
Dept: RADIATION ONCOLOGY | Facility: MEDICAL CENTER | Age: 70
Discharge: HOME | End: 2020-02-05
Attending: RADIOLOGY
Payer: MEDICARE

## 2020-02-05 PROCEDURE — 77385 HC NTSTY MODUL RAD TX DLVR SMPL: CPT | Performed by: RADIOLOGY

## 2020-02-07 ENCOUNTER — HOSPITAL ENCOUNTER (OUTPATIENT)
Dept: RADIATION ONCOLOGY | Facility: MEDICAL CENTER | Age: 70
Discharge: HOME | End: 2020-02-07
Attending: RADIOLOGY
Payer: MEDICARE

## 2020-02-07 PROCEDURE — 77385 HC NTSTY MODUL RAD TX DLVR SMPL: CPT | Performed by: RADIOLOGY

## 2020-02-10 ENCOUNTER — HOSPITAL ENCOUNTER (OUTPATIENT)
Dept: RADIATION ONCOLOGY | Facility: MEDICAL CENTER | Age: 70
Discharge: HOME | End: 2020-02-10
Attending: RADIOLOGY
Payer: MEDICARE

## 2020-02-10 PROCEDURE — 77336 RADIATION PHYSICS CONSULT: CPT | Performed by: RADIOLOGY

## 2020-02-10 PROCEDURE — 77385 HC NTSTY MODUL RAD TX DLVR SMPL: CPT | Performed by: RADIOLOGY

## 2020-02-11 ENCOUNTER — HOSPITAL ENCOUNTER (OUTPATIENT)
Dept: RADIATION ONCOLOGY | Facility: MEDICAL CENTER | Age: 70
Discharge: HOME | End: 2020-02-11
Payer: MEDICARE

## 2020-02-11 ENCOUNTER — HOSPITAL ENCOUNTER (OUTPATIENT)
Dept: RADIATION ONCOLOGY | Facility: MEDICAL CENTER | Age: 70
Discharge: HOME | End: 2020-02-11
Attending: RADIOLOGY
Payer: MEDICARE

## 2020-02-11 VITALS
BODY MASS INDEX: 23.23 KG/M2 | SYSTOLIC BLOOD PRESSURE: 138 MMHG | TEMPERATURE: 98.1 F | WEIGHT: 146.13 LBS | DIASTOLIC BLOOD PRESSURE: 83 MMHG | RESPIRATION RATE: 16 BRPM | HEART RATE: 67 BPM | OXYGEN SATURATION: 100 %

## 2020-02-11 PROCEDURE — 77385 HC NTSTY MODUL RAD TX DLVR SMPL: CPT

## 2020-02-11 NOTE — PROGRESS NOTES
[facility] Oncology  On Treatment Visit (OTV) Note     Diagnosis: 68year-old man diagnosed with high risk prostate adenocarcinoma (PSA 13 ng/mL, Luke score 4+4, pT3b) status post R0 robotic radical prostatectomy and lymph node dissection on 9/18/2019.        Dose Accrued: 3780/6840 cGy     S: Patient is doing well, same as last week. He is tolerating daily set up and treatment delivery without difficulty.  Denies new or unusual symptoms.  Patient continues to work in full-time capacity as an . He wears a pad during work \"just in case, for protection. \"  Nocturia x 1. Denies urinary frequency or urgency during day.  Normal bowel movements, usually 1x in the evening.     O: /83   Pulse 67   Temp 98.1 °F (36.7 °C) (Oral)   Resp 16   Wt 146 lb 2 oz (66.3 kg)   SpO2 100%   BMI 23.23 kg/m²    Well-appearing, in no apparent distress, alert and fully oriented, answers questions appropriately.  No signs of acute radiation-induced toxicity on physical exam.     IGRT: Set-up images acquired to ensure daily treatment accuracy and precision were reviewed by the physician.     A&P: Continue radiotherapy as planned.

## 2020-02-12 ENCOUNTER — HOSPITAL ENCOUNTER (OUTPATIENT)
Dept: RADIATION ONCOLOGY | Facility: MEDICAL CENTER | Age: 70
Discharge: HOME | End: 2020-02-12
Attending: RADIOLOGY
Payer: MEDICARE

## 2020-02-12 PROCEDURE — 77385 HC NTSTY MODUL RAD TX DLVR SMPL: CPT

## 2020-02-13 ENCOUNTER — HOSPITAL ENCOUNTER (OUTPATIENT)
Dept: RADIATION ONCOLOGY | Facility: MEDICAL CENTER | Age: 70
Discharge: HOME | End: 2020-02-13
Attending: RADIOLOGY
Payer: MEDICARE

## 2020-02-13 PROCEDURE — 77385 HC NTSTY MODUL RAD TX DLVR SMPL: CPT

## 2020-02-14 ENCOUNTER — HOSPITAL ENCOUNTER (OUTPATIENT)
Dept: RADIATION ONCOLOGY | Facility: MEDICAL CENTER | Age: 70
Discharge: HOME | End: 2020-02-14
Attending: RADIOLOGY
Payer: MEDICARE

## 2020-02-14 PROCEDURE — 77385 HC NTSTY MODUL RAD TX DLVR SMPL: CPT

## 2020-02-17 ENCOUNTER — HOSPITAL ENCOUNTER (OUTPATIENT)
Dept: RADIATION ONCOLOGY | Facility: MEDICAL CENTER | Age: 70
Discharge: HOME | End: 2020-02-17
Attending: RADIOLOGY
Payer: MEDICARE

## 2020-02-17 PROCEDURE — 77385 HC NTSTY MODUL RAD TX DLVR SMPL: CPT

## 2020-02-18 ENCOUNTER — HOSPITAL ENCOUNTER (OUTPATIENT)
Dept: RADIATION ONCOLOGY | Facility: MEDICAL CENTER | Age: 70
Discharge: HOME | End: 2020-02-18
Payer: MEDICARE

## 2020-02-18 ENCOUNTER — HOSPITAL ENCOUNTER (OUTPATIENT)
Dept: RADIATION ONCOLOGY | Facility: MEDICAL CENTER | Age: 70
Discharge: HOME | End: 2020-02-18
Attending: RADIOLOGY
Payer: MEDICARE

## 2020-02-18 VITALS
OXYGEN SATURATION: 100 % | RESPIRATION RATE: 16 BRPM | WEIGHT: 148 LBS | TEMPERATURE: 98.4 F | SYSTOLIC BLOOD PRESSURE: 138 MMHG | DIASTOLIC BLOOD PRESSURE: 84 MMHG | BODY MASS INDEX: 23.53 KG/M2 | HEART RATE: 64 BPM

## 2020-02-18 PROCEDURE — 77385 HC NTSTY MODUL RAD TX DLVR SMPL: CPT

## 2020-02-18 PROCEDURE — 77336 RADIATION PHYSICS CONSULT: CPT

## 2020-02-18 NOTE — PROGRESS NOTES
Jose D Lynne  1950  Feb 18, 2020    [facility] Radiation Oncology  On Treatment Visit (OTV) Note     Diagnosis: 68year-old man diagnosed with high risk prostate adenocarcinoma (PSA 13 ng/mL, [place] score 4+4, pT3b) status post R0 robotic radical prostatectomy and lymph node dissection on 9/18/2019.        Dose Accrued: 4750/6840 cGy     S: Patient is doing well, essentially same as last week. He is tolerating daily set up and treatment delivery without difficulty.  Denies new or unusual symptoms.  Patient continues to work in full-time capacity as an . He wears a pad during work \"just in case, for protection. \"  Nocturia x 1. Denies urinary frequency or urgency during day.  Normal bowel movements, usually 1x in the evening.     O: /84   Pulse 64   Temp 98.4 °F (36.9 °C) (Oral)   Resp 16   Wt 148 lb (67.1 kg)   SpO2 100%   BMI 23.53 kg/m² Well-appearing, in no apparent distress, alert and fully oriented, answers questions appropriately.  No signs of acute radiation-induced toxicity on physical exam.     IGRT: Set-up images acquired to ensure daily treatment accuracy and precision were reviewed by the physician.     A&P: Continue radiotherapy as planned.      Electronically signed by Eric Cartwright MD on 2/18/2020 at 9:53 AM

## 2020-02-19 ENCOUNTER — HOSPITAL ENCOUNTER (OUTPATIENT)
Dept: RADIATION ONCOLOGY | Facility: MEDICAL CENTER | Age: 70
Discharge: HOME | End: 2020-02-19
Attending: RADIOLOGY
Payer: MEDICARE

## 2020-02-19 PROCEDURE — 77385 HC NTSTY MODUL RAD TX DLVR SMPL: CPT | Performed by: RADIOLOGY

## 2020-02-19 PROCEDURE — 77336 RADIATION PHYSICS CONSULT: CPT | Performed by: RADIOLOGY

## 2020-02-20 ENCOUNTER — HOSPITAL ENCOUNTER (OUTPATIENT)
Dept: RADIATION ONCOLOGY | Facility: MEDICAL CENTER | Age: 70
Discharge: HOME | End: 2020-02-20
Attending: RADIOLOGY
Payer: MEDICARE

## 2020-02-20 PROCEDURE — 77385 HC NTSTY MODUL RAD TX DLVR SMPL: CPT

## 2020-02-21 ENCOUNTER — HOSPITAL ENCOUNTER (OUTPATIENT)
Dept: RADIATION ONCOLOGY | Facility: MEDICAL CENTER | Age: 70
Discharge: HOME | End: 2020-02-21
Attending: RADIOLOGY
Payer: MEDICARE

## 2020-02-21 PROCEDURE — 77385 HC NTSTY MODUL RAD TX DLVR SMPL: CPT | Performed by: RADIOLOGY

## 2020-02-24 ENCOUNTER — HOSPITAL ENCOUNTER (OUTPATIENT)
Dept: RADIATION ONCOLOGY | Facility: MEDICAL CENTER | Age: 70
Discharge: HOME | End: 2020-02-24
Attending: RADIOLOGY
Payer: MEDICARE

## 2020-02-24 PROCEDURE — 77385 HC NTSTY MODUL RAD TX DLVR SMPL: CPT | Performed by: RADIOLOGY

## 2020-02-25 ENCOUNTER — HOSPITAL ENCOUNTER (OUTPATIENT)
Dept: RADIATION ONCOLOGY | Facility: MEDICAL CENTER | Age: 70
Discharge: HOME | End: 2020-02-25
Attending: RADIOLOGY
Payer: MEDICARE

## 2020-02-25 ENCOUNTER — HOSPITAL ENCOUNTER (OUTPATIENT)
Dept: RADIATION ONCOLOGY | Facility: MEDICAL CENTER | Age: 70
Discharge: HOME | End: 2020-02-25
Payer: MEDICARE

## 2020-02-25 VITALS
WEIGHT: 148.25 LBS | BODY MASS INDEX: 23.57 KG/M2 | TEMPERATURE: 97.9 F | HEART RATE: 64 BPM | RESPIRATION RATE: 16 BRPM | DIASTOLIC BLOOD PRESSURE: 82 MMHG | OXYGEN SATURATION: 98 % | SYSTOLIC BLOOD PRESSURE: 133 MMHG

## 2020-02-25 PROCEDURE — 77385 HC NTSTY MODUL RAD TX DLVR SMPL: CPT

## 2020-02-25 NOTE — PROGRESS NOTES
Barbie Gramajo  2/25/2020  Wt Readings from Last 3 Encounters:   02/25/20 148 lb 4 oz (67.2 kg)   02/18/20 148 lb (67.1 kg)   02/11/20 146 lb 2 oz (66.3 kg)     Body mass index is 23.57 kg/m². Pt here for OTV. Patient  Denies any new problems or pain. Dr. Balbir Lopez to [facility].    Treatment Area:prostate    Patient was seen today for weekly visit. Comfort Alteration    Fatigue: None    Nutritional Alteration  Anorexia: No  Nausea: No  Vomiting: No     Elimination Alterations  Constipation: no  Diarrhea:  no  Urinary Frequency/Urgency: Yes  Urinary Retention: No  Dysuria: No  Urinary Incontinence: Yes  Proctitis: No  Nocturia: Yes and No #/night: 0     Emotional  Coping: effective    Sexuality Alteration  absent    Skin Alteration   Sensation:none    Radiation Dermatitis:  Intact [x]     Erythema  []     Discoloration  []     Rash []     Dry desquamation  []     Moist desquamation []           Injury, potential bleeding or infection: none    Lab Results   Component Value Date    WBC 11.2 09/19/2019     09/19/2019         /82   Pulse 64   Temp 97.9 °F (36.6 °C) (Oral)   Resp 16   Wt 148 lb 4 oz (67.2 kg)   SpO2 98%   BMI 23.57 kg/m²   Patient Currently in Pain: No               Assessment/Plan: Patient was seen today for weekly visit.       Leonie Ahn

## 2020-02-25 NOTE — PROGRESS NOTES
Patient: Gaurav Chowdhury  : 1950  Date of Service: 2020  [facility]  On Treatment Visit (OTV) Note     Diagnosis: 68year-old man diagnosed with high risk prostate adenocarcinoma (PSA 13 ng/mL, [place] score 4+4, pT3b) status post R0 robotic radical prostatectomy and lymph node dissection on 2019, now receiving adjuvant radiotherapy to the prostatic fossa.     Dose Accrued: 5700/6840 cGy     S: Patient is doing well, essentially same as last week. He is tolerating daily set up and treatment delivery without difficulty.  Denies new or unusual symptoms.  Patient continues to work in full-time capacity as an . He wears a pad during work \"just in case, for protection. \"  Nocturia x 1. Denies urinary frequency or urgency during day. Normal bowel movements, usually 1x in the evening.     O: /82   Pulse 64   Temp 97.9 °F (36.6 °C) (Oral)   Resp 16   Wt 148 lb 4 oz (67.2 kg)   SpO2 98%   BMI 23.57 kg/m²  Well-appearing, in no apparent distress, alert and fully oriented, answers questions appropriately.  No signs of acute radiation-induced toxicity on physical exam.     IGRT: Set-up images acquired to ensure daily treatment accuracy and precision were reviewed by the physician.     A&P: Continue radiotherapy as planned.      Electronically signed by Yesica Russ MD on 2020 at 9:58 AM

## 2020-02-26 ENCOUNTER — HOSPITAL ENCOUNTER (OUTPATIENT)
Dept: RADIATION ONCOLOGY | Facility: MEDICAL CENTER | Age: 70
Discharge: HOME | End: 2020-02-26
Attending: RADIOLOGY
Payer: MEDICARE

## 2020-02-26 PROCEDURE — 77336 RADIATION PHYSICS CONSULT: CPT

## 2020-02-26 PROCEDURE — 77385 HC NTSTY MODUL RAD TX DLVR SMPL: CPT | Performed by: RADIOLOGY

## 2020-02-27 ENCOUNTER — HOSPITAL ENCOUNTER (OUTPATIENT)
Dept: RADIATION ONCOLOGY | Facility: MEDICAL CENTER | Age: 70
Discharge: HOME | End: 2020-02-27
Attending: RADIOLOGY
Payer: MEDICARE

## 2020-02-27 PROCEDURE — 77385 HC NTSTY MODUL RAD TX DLVR SMPL: CPT

## 2020-02-28 ENCOUNTER — HOSPITAL ENCOUNTER (OUTPATIENT)
Dept: RADIATION ONCOLOGY | Facility: MEDICAL CENTER | Age: 70
Discharge: HOME | End: 2020-02-28
Attending: RADIOLOGY
Payer: MEDICARE

## 2020-02-28 PROCEDURE — 77385 HC NTSTY MODUL RAD TX DLVR SMPL: CPT

## 2020-03-02 ENCOUNTER — HOSPITAL ENCOUNTER (OUTPATIENT)
Dept: RADIATION ONCOLOGY | Facility: MEDICAL CENTER | Age: 70
Discharge: HOME | End: 2020-03-02
Attending: RADIOLOGY
Payer: MEDICARE

## 2020-03-02 PROCEDURE — 77385 HC NTSTY MODUL RAD TX DLVR SMPL: CPT

## 2020-03-03 ENCOUNTER — HOSPITAL ENCOUNTER (OUTPATIENT)
Dept: RADIATION ONCOLOGY | Facility: MEDICAL CENTER | Age: 70
Discharge: HOME | End: 2020-03-03
Attending: RADIOLOGY
Payer: MEDICARE

## 2020-03-03 ENCOUNTER — APPOINTMENT (OUTPATIENT)
Dept: RADIATION ONCOLOGY | Facility: MEDICAL CENTER | Age: 70
End: 2020-03-03
Payer: MEDICARE

## 2020-03-03 ENCOUNTER — HOSPITAL ENCOUNTER (OUTPATIENT)
Dept: RADIATION ONCOLOGY | Facility: MEDICAL CENTER | Age: 70
Discharge: HOME | End: 2020-03-03
Payer: MEDICARE

## 2020-03-03 VITALS
WEIGHT: 146.5 LBS | TEMPERATURE: 98.1 F | SYSTOLIC BLOOD PRESSURE: 136 MMHG | HEART RATE: 65 BPM | OXYGEN SATURATION: 100 % | BODY MASS INDEX: 23.29 KG/M2 | DIASTOLIC BLOOD PRESSURE: 82 MMHG | RESPIRATION RATE: 16 BRPM

## 2020-03-03 PROCEDURE — 77385 HC NTSTY MODUL RAD TX DLVR SMPL: CPT

## 2020-03-03 NOTE — PROGRESS NOTES
Patient: Keshia Montiel  : 1950  Date of Service: 2020  [facility]  On Treatment Visit (OTV) Note     Diagnosis: 68year-old man diagnosed with high risk prostate adenocarcinoma (PSA 13 ng/mL, Luke score 4+4, pT3b) status post R0 robotic radical prostatectomy and lymph node dissection on 2019, now receiving adjuvant radiotherapy to the prostatic fossa.     Dose Accrued: 6650/6840 cGy      S: Patient is doing well, looking forward to finishing the course tomorrow. He is tolerating daily set up and treatment delivery without difficulty.  Denies new or unusual symptoms.  Patient continues to work in full-time capacity as an . He wears a pad during work \"just in case, for protection. \"  Nocturia x 1. Denies urinary frequency or urgency during day. Normal bowel movements, usually 1x in the evening.     O: /82   Pulse 65   Temp 98.1 °F (36.7 °C) (Oral)   Resp 16   Wt 146 lb 8 oz (66.5 kg)   SpO2 100%   BMI 23.29 kg/m²   Well-appearing, in no apparent distress, alert and fully oriented, answers questions appropriately.  No signs of acute radiation-induced toxicity on physical exam.     IGRT: Set-up images acquired to ensure daily treatment accuracy and precision were reviewed by the physician.     A&P: Complete course tomorrow. RTC in 3 months with PSA a day prior. He will f/u with his urologist, Dr. Trsea Barr, in April.     Electronically signed by Alisson Castorena MD on 3/3/2020 at 9:30 AM

## 2020-03-04 ENCOUNTER — HOSPITAL ENCOUNTER (OUTPATIENT)
Dept: RADIATION ONCOLOGY | Facility: MEDICAL CENTER | Age: 70
Discharge: HOME | End: 2020-03-04
Attending: RADIOLOGY
Payer: MEDICARE

## 2020-03-04 ENCOUNTER — APPOINTMENT (OUTPATIENT)
Dept: RADIATION ONCOLOGY | Facility: MEDICAL CENTER | Age: 70
End: 2020-03-04
Payer: MEDICARE

## 2020-03-04 PROCEDURE — 77385 HC NTSTY MODUL RAD TX DLVR SMPL: CPT

## 2020-03-05 ENCOUNTER — APPOINTMENT (OUTPATIENT)
Dept: RADIATION ONCOLOGY | Facility: MEDICAL CENTER | Age: 70
End: 2020-03-05
Payer: MEDICARE

## 2020-04-06 ENCOUNTER — TELEPHONE (OUTPATIENT)
Dept: RADIATION ONCOLOGY | Age: 70
End: 2020-04-06

## 2020-04-13 ENCOUNTER — TELEPHONE (OUTPATIENT)
Dept: RADIATION ONCOLOGY | Age: 70
End: 2020-04-13

## 2020-06-23 ENCOUNTER — HOSPITAL ENCOUNTER (OUTPATIENT)
Age: 70
Setting detail: SPECIMEN
Discharge: HOME | End: 2020-06-23
Payer: MEDICARE

## 2020-06-23 LAB — PROSTATE SPECIFIC ANTIGEN: <0.01 UG/L

## 2020-06-26 ENCOUNTER — TELEPHONE (OUTPATIENT)
Dept: RADIATION ONCOLOGY | Facility: MEDICAL CENTER | Age: 70
End: 2020-06-26

## 2020-06-26 ENCOUNTER — HOSPITAL ENCOUNTER (OUTPATIENT)
Dept: RADIATION ONCOLOGY | Facility: MEDICAL CENTER | Age: 70
Discharge: HOME | End: 2020-06-26
Attending: RADIOLOGY
Payer: MEDICARE

## 2020-06-26 VITALS
RESPIRATION RATE: 16 BRPM | WEIGHT: 148.5 LBS | DIASTOLIC BLOOD PRESSURE: 95 MMHG | HEART RATE: 69 BPM | SYSTOLIC BLOOD PRESSURE: 157 MMHG | BODY MASS INDEX: 23.61 KG/M2 | OXYGEN SATURATION: 99 % | TEMPERATURE: 98.2 F

## 2020-06-26 PROCEDURE — 99212 OFFICE O/P EST SF 10 MIN: CPT | Performed by: RADIOLOGY

## 2020-06-26 PROCEDURE — 99214 OFFICE O/P EST MOD 30 MIN: CPT | Performed by: RADIOLOGY

## 2020-06-26 NOTE — PROGRESS NOTES
[facility] Radiation Oncology  Follow-Up note    Date of Service: 2020    Location: [facility]      Patient ID:   Manolo Sutton  : 1950   MRN: [id number]    DIAGNOSIS: 73-year-old man diagnosed with high risk prostate adenocarcinoma (PSA 13 ng/mL, [place] score 4+4, pT3b, Grade Group 4) status post R0 robotic radical prostatectomy and lymph node dissection on 2019, completed adjuvant radiotherapy to the prostatic fossa on 3/4/2020. Chief Complaint: \"I'm so happy with everything. \"    INTERVAL HISTORY:   Manolo Sutton returns in a previously scheduled follow-up visit. Patient was last seen in our clinic at the conclusion of his radiotherapy course in early March. He was scheduled to follow-up with me earlier, but due to COVID-19 disruptions, we had to reschedule his appointment. Patient did have a telemedicine visit with Dr. Angel Johnson, and will follow up with him in person later this year. In terms of urinary symptoms, he is pleased with his quality of life. His AUA symptom score is 1/25 corresponding to minimal urinary morbidity. Specifically, he experiences urinary urgency less than 20% of the time, and denies experiencing nocturia, incomplete bladder emptying, frequency, intermittency, or weak stream.  He moves his bowels regularly, with no change in color/caliber/consistency of his stools. He continues to wear a pad just in case out of an abundance of precaution when at work, but states there is occasionally a couple of drops of urine in terms of incontinence, which is a significant improvement from before. Overall, he is very happy with \"how everything turned out. \" He is also a grandfather as of March, and is happy from that standpoint. I closely reviewed the medical, surgical, social and family history as per the detailed physician notes from our department. Interim changes as noted above.     MEDICATIONS:    Current Outpatient Medications:     docusate sodium (COLACE,

## 2020-06-26 NOTE — PROGRESS NOTES
Bicep :    Lower Bicep :         FALLS RISK SCREEN  Instructions:  Assess the patient and enter the appropriate indicators that are present for fall risk identification. Total the numbers entered and assign a fall risk score from Table 2.  Reassess patient at a minimum every 12 weeks or with status change. Assessment   Date  6/26/2020     1. Mental Ability: confusion/cognitively impaired 0     2. Elimination Issues: incontinence, frequency 0       3. Ambulatory: use of assistive devices (walker, cane, off-loading devices),        attached to equipment (IV pole, oxygen) 0     4. Sensory Limitations: dizziness, vertigo, impaired vision 0     5. Age less than 65        0     6. Age 72 or greater 1     7. Medication: diuretics, strong analgesics, hypnotics, sedatives,        antihypertensive agents 0   8. Falls:  recent history of falls within the last 3 months (not to include slipping or        tripping) 0   TOTAL 1    If score of 4 or greater was education given? No           TABLE 2   Risk Score Risk Level Plan of Care   0-3 Little or  No Risk 1. Provide assistance as indicated for ambulation activities  2. Reorient confused/cognitively impaired patient  3. Chair/bed in low position, stretcher/bed with siderails up except when performing patient care activities  5. Educate patient/family/caregiver on falls prevention  6.  Reassess in 12 weeks or with any noted change in patient condition which places them at a risk for a fall   4-6 Moderate Risk 1. Provide assistance as indicated for ambulation activities  2. Reorient confused/cognitively impaired patient  3. Chair/bed in low position, stretcher/bed with siderails up except when performing patient care activities  4. Educate patient/family/caregiver on falls prevention     7 or   Higher High Risk 1. Place patient in easily observable treatment room  2. Patient attended at all times by family member or staff  3.   Provide assistance as indicated for ambulation activities  4. Reorient confused/cognitively impaired patient  5. Chair/bed in low position, stretcher/bed with siderails up except when performing patient care activities  6.   Educate patient/family/caregiver on falls prevention         PLAN: Patient is seen today in follow up        Jose Luis Love

## 2020-12-14 ENCOUNTER — TELEPHONE (OUTPATIENT)
Dept: RADIATION ONCOLOGY | Facility: MEDICAL CENTER | Age: 70
End: 2020-12-14

## 2020-12-14 NOTE — TELEPHONE ENCOUNTER
Hardeep Stern called to see when his next appointment is. He also asked me to send him order for next PSA. Order mailed.

## 2021-04-28 DIAGNOSIS — C61 PROSTATE CANCER (HCC): Primary | ICD-10-CM

## 2021-06-28 ENCOUNTER — TELEPHONE (OUTPATIENT)
Dept: RADIATION ONCOLOGY | Facility: MEDICAL CENTER | Age: 71
End: 2021-06-28

## 2021-06-28 NOTE — TELEPHONE ENCOUNTER
Pt called stating he received order for PSA in mail and he will get drawn week prior to his appt @[facility] lab

## 2021-07-14 ENCOUNTER — TELEPHONE (OUTPATIENT)
Dept: RADIATION ONCOLOGY | Facility: MEDICAL CENTER | Age: 71
End: 2021-07-14

## 2021-07-15 ENCOUNTER — HOSPITAL ENCOUNTER (OUTPATIENT)
Dept: RADIATION ONCOLOGY | Facility: MEDICAL CENTER | Age: 71
Discharge: HOME | End: 2021-07-15
Attending: RADIOLOGY
Payer: MEDICARE

## 2021-07-15 VITALS
DIASTOLIC BLOOD PRESSURE: 97 MMHG | OXYGEN SATURATION: 99 % | HEART RATE: 64 BPM | BODY MASS INDEX: 26.49 KG/M2 | TEMPERATURE: 97 F | RESPIRATION RATE: 18 BRPM | SYSTOLIC BLOOD PRESSURE: 160 MMHG | WEIGHT: 166.6 LBS

## 2021-07-15 PROCEDURE — 99212 OFFICE O/P EST SF 10 MIN: CPT | Performed by: RADIOLOGY

## 2021-07-15 PROCEDURE — 99213 OFFICE O/P EST LOW 20 MIN: CPT | Performed by: RADIOLOGY

## 2021-07-15 PROCEDURE — 99211 OFF/OP EST MAY X REQ PHY/QHP: CPT | Performed by: RADIOLOGY

## 2021-07-15 RX ORDER — ROSUVASTATIN CALCIUM 10 MG/1
TABLET, COATED ORAL
COMMUNITY
Start: 2021-04-07

## 2021-07-15 NOTE — PROGRESS NOTES
"  Occupational Therapy Evaluation    ASSESSMENT:   Patient seen on 5th floor nursing unit. Patient presents near baseline which was modified independent with mobility with 2WW. Daughter called to provide prior living information for patient. Pt lives in [place] apartment at [facility] and recently started using [employer] in past month for mobility and was mod I/set-up for dressing, toileting and has assist with bathing and IADL's. Prior to this pt had been using cane and had multiple falls. No falls since using [employer] per daughter. Pt admitted with AMS, CT scan indicating acute right cerebellar infarct. Pt with baseline alzheimer's and daughter reports pt with variable cognition at baseline. For safe return to prior living situation the patient needs to be modified independent  for overall mobility and varied assist with ADL's. The patient now presents with impairments in activity tolerance, cognitive changes, limited knowledge of compensatory strategies and safety awareness which impact safe and effective performance inbathing and functional transfers/mobility . Pt oriented to self only, pleasantly confused. Pt denies any sensory changes but does reports R UE/LE feels ""heavier. \"" MMT appears equal between L/R UE. UE coordination intact. Pt was CGA for transfers including raised commode transfer and supv-CGA for functional mobility using w/w with cues for w/w proximity. Pt negotiating w/w in room with min verbal cues. Pt performed grooming and oral cares in stance- with intact sequencing and ability to locate all objects on counter in B visual fields. During vision assessment pt reports \""blurriness/blackness\"" in L visual field. Impaired L upper and lower quadrants peripheral vision during confrontation testing but not running into any objects/furniture during functional mobility. Difficult to assess vision due to baseline cognitive deficits with understanding all directions.  Rec return to AL with slightly increased " Patel Carias  7/15/2021  1:43 PM      Vitals:    07/15/21 1332   BP: (!) 160/97   Pulse: 64   Resp: 18   Temp: 97 °F (36.1 °C)   SpO2: 99%    :  Patient Currently in Pain: No             Wt Readings from Last 1 Encounters:   07/15/21 166 lb 9.6 oz (75.6 kg)                Current Outpatient Medications:     rosuvastatin (CRESTOR) 10 MG tablet, TAKE 1 TABLET BY MOUTH ONCE DAILY FOR 30 DAYS, Disp: , Rfl:     docusate sodium (COLACE, DULCOLAX) 100 MG CAPS, Take 100 mg by mouth 2 times daily (Patient taking differently: Take 100 mg by mouth as needed ), Disp: 60 capsule, Rfl: 1    amLODIPine (NORVASC) 5 MG tablet, Take 5 mg by mouth daily , Disp: , Rfl: 5    lisinopril (PRINIVIL;ZESTRIL) 20 MG tablet, Take 20 mg by mouth 2 times daily , Disp: , Rfl: 5    metoprolol tartrate (LOPRESSOR) 50 MG tablet, Take 50 mg by mouth 2 times daily , Disp: , Rfl: 5    Immunizations:    Influenza status:    []   Current   [x]   Patient declined    Pneumococcal status:  []   Current  [x]   Patient declined  Covid status:   [x]  Dose #1:                     [x]  Dose #2:               []   Patient declined    Smoking Status:    [] Smoker - PPD:   [] Nonsmoker - Quit Date:               [x] Never a smoker      Cancer Screening:  Colonoscopy   [x] Current       [] Not current   [] Not current, but scheduled   [] NA  Mammogram   [] Current       [] Not current   [] Not current, but scheduled   [x] NA  Prostate           [] Current       [x] Not current   [] Not current, but scheduled   [] NA  PAP/Pelvic      [] Current       [] Not current   [] Not current, but scheduled   [x] NA  Skin                 [] Current       [x] Not current   [] Not current, but scheduled   [] NA     Hormone:  Lupron [x]   Last dose given:           Next dose due:   Eligard []   Last dose given:           Next dose due:   Aromatase Inhibitors []   Medication name:   N/A:  []             *BREAST Patient only:    Lymphedema Eval:   [] left arm      [] right arm  Location:     Measurement (cm)    Upper Bicep :    Lower Bicep :         FALLS RISK SCREEN  Instructions:  Assess the patient and enter the appropriate indicators that are present for fall risk identification. Total the numbers entered and assign a fall risk score from Table 2.  Reassess patient at a minimum every 12 weeks or with status change. Assessment   Date  7/15/2021     1. Mental Ability: confusion/cognitively impaired 0     2. Elimination Issues: incontinence, frequency 0       3. Ambulatory: use of assistive devices (walker, cane, off-loading devices),        attached to equipment (IV pole, oxygen) 0     4. Sensory Limitations: dizziness, vertigo, impaired vision 0     5. Age less than 65        0     6. Age 72 or greater 1     7. Medication: diuretics, strong analgesics, hypnotics, sedatives,        antihypertensive agents 3   8. Falls:  recent history of falls within the last 3 months (not to include slipping or        tripping) 0   TOTAL 4    If score of 4 or greater was education given? Yes           TABLE 2   Risk Score Risk Level Plan of Care   0-3 Little or  No Risk 1. Provide assistance as indicated for ambulation activities  2. Reorient confused/cognitively impaired patient  3. Chair/bed in low position, stretcher/bed with siderails up except when performing patient care activities  5. Educate patient/family/caregiver on falls prevention  6.  Reassess in 12 weeks or with any noted change in patient condition which places them at a risk for a fall   4-6 Moderate Risk 1. Provide assistance as indicated for ambulation activities  2. Reorient confused/cognitively impaired patient  3. Chair/bed in low position, stretcher/bed with siderails up except when performing patient care activities  4. Educate patient/family/caregiver on falls prevention     7 or   Higher High Risk 1. Place patient in easily observable treatment room  2.   Patient attended at all times by family member assist with ADL's if this is possible and home therapy vs SA rehab pending what AL can provide. Further skilled occupational therapy services are reasonable and necessary to address the above impairments and performance deficits to maximize the patient's independence    Recommending discharge to [address] with home therapy and slightly increased assist if they are able to provide this as patient presenting slightly below baseline . PLAN AND RECOMMENDATIONS:   Objective Measures Impacting Discharge Recommendation:   No formal objective measures completed this session    Recommendations for Discharge:  Recommendations for Discharge: OT: Less intensive rehab, Assisted living, Home therapy      Plan:   Continue skilled OT, including the following Treatment Interventions: ADL retraining;Functional transfer training (08/01/19 0825)   , Frequency Comments: M-F (AL w/ H OT vs SA), [place] (08/01/19 0825)     Treatment Plan for Next Session: spongebathing at sink, standing for several grooming tasks, asses peripheral vision, full dressing with set-up                 DIAGNOSIS:   1. Cerebrovascular accident (CVA) due to occlusion of cerebellar artery, unspecified blood vessel laterality (CMS/HCC)    2. Altered mental status, unspecified altered mental status type    3. Hypokalemia           PRECAUTIONS:   Precautions  Other Precautions: admitted with AMS, CVA (CT scan indicating acute right cerebellar infarct       EDUCATION:   On this date, the patient was educated on self-cares and household mobility. The response to education was: Needs reinforcement. Discussed with Patient the need for adequate help at home upon discharge. Patient currently has sufficient help at their previous living situation. Please see above for discharge recommendations. Family/caregiver teaching is not appropriate based on discharge plan.      SUBJECTIVE:   Subjective: pt alert, oriented to self only (08/01/19 0825)       OBJECTIVE:   Self Cares/ADLs:   Self Cares/ADL's  Grooming Assistance: Supervision;Standing at sink (08/01/19 0825)  Oral Hygiene Assistance: Supervision;Standing at sink (08/01/19 0825)  Upper Body Dressing Assistance: Set-up (08/01/19 0825)  Upper Body Dressing Deficit: Verbal cueing (08/01/19 0825)  Lower Body Clothing Assistance: Modified independent; Chair (08/01/19 0825)  Self Cares/ADL's Comments #1: pt able to sequence oral cares, combing hair, locate all objects on counter in B visual fields. (08/01/19 0825)      Household Mobility:    Household Mobility  Supine to Sit: Supervision(cues to initiate) (08/01/19 0825)  Sit to Stand: Touching/Steadying Assistance(cues for safe hand placement) (08/01/19 0825)  Stand to Sit: Touching/Steadying Assistance (08/01/19 0825)  Toilet Transfers: Touching/Steadying Assistance(raised toilet, cues for safe hand placement) (08/01/19 0825)  Household Mobility Comments #1: Pt was CGA for transfers including raised commode transfer and supv-CGA for functional mobility using w/w with cues for w/w proximity. Pt negotiating w/w in room with min verbal cues. (08/01/19 0825)    Home Management:         Cognition:       Communication/Cognition  Communication: Clear speech (08/01/19 0825)  Overall Cognitive Status: Impaired (08/01/19 0825)  Attention: Attends with cues to redirect (08/01/19 0825)  Memory: Decreased short term memory;Decreased long term memory (08/01/19 0825)  Following Verbal Directions: Follows one step directions with repetition (08/01/19 0825)  Awareness of Deficits: Decreased awareness of deficits (08/01/19 0825)    Patient's Personal Goal: return home (08/01/19 0825)     EQUIPMENT   Equipment:  PT/OT Mobility Equipment for Discharge: has [address] (08/01/19 0000)  PT/OT ADL Equipment for Discharge: facility owns shower chair, likely no needs (08/01/19 0825)     GOALS:   Short Term Goals to Be Reviewed On: 08/07/19 (08/01/19 0825)  Short Term Goals Are The Same as Discharge Goals:  Yes or staff  3. Provide assistance as indicated for ambulation activities  4. Reorient confused/cognitively impaired patient  5. Chair/bed in low position, stretcher/bed with siderails up except when performing patient care activities  6. Educate patient/family/caregiver on falls prevention         PLAN: Patient is seen today in follow up. Denies pain or issues regarding previous RT. Dr. Janis Knight updated and examined pt. Per MD pt will follow up in about 6 months. PSA was not complete prior to today's appt. Pt will stop at [facility] lab on his way out and [place] will monitor for results tomorrow.          Mckinley Reina RN (08/01/19 0825)  Goal Agreement: Patient agrees with goals and treatment plan (08/01/19 0825)  Grooming Discharge Goal 1: mod I standing for 3 grooming tasks (08/01/19 0825)  Bathing Discharge Goal 1: mod assist (08/01/19 0825)  Dressing Discharge Goal 1: set-up (08/01/19 0825)  Toileting Discharge Goal 1: mod I (08/01/19 0825)       EVALUATION CODE JUSTIFICATION:   Eval Code:  $ OT Eval:  Low Complexity: 1 Procedure  Problems/Co-morbidities:   Patient Active Problem List   Diagnosis   â¢ Essential hypertension   â¢ Other and unspecified hyperlipidemia   â¢ Disorder of bone and cartilage, unspecified   â¢ Vitamin D deficiency   â¢ Bilateral hearing loss   â¢ Memory loss   â¢ Late onset Alzheimer's disease without behavioral disturbance   â¢ Stroke (CMS/HCC)   â¢ Decreased functional mobility     Personal Occupations Profile Affected: bathing/showering, lower body dressing, functional mobility/transfers  Task Modification: No task modification  Clinical decision making: Low - Patient has few limitations (1-3), comorbidities and/or complexities, as noted in problem focused assessment noted above, that impact their occupational profile. Resulting in few treatment options and no task modification consistent with low clinical decision making complexity.

## 2021-07-16 DIAGNOSIS — C61 PROSTATE CANCER (HCC): ICD-10-CM

## 2021-07-20 NOTE — PROGRESS NOTES
[address]            Radiation Oncology          [address] [facility]          [place], [address]        [place] As[place]: [phone]        F: [phone]       [email or web address]         Date of Service: 7/15/2021     Location:  [address] [place] [address] FOLLOW UP NOTE    Patient ID:   Lesly Noble  : 1950   MRN: [id number]    DIAGNOSIS:  Cancer Staging  Prostate cancer [facility])  Staging form: Prostate, AJCC 8th Edition  - Clinical stage from 2019: Stage Unknown (cT1c, cNX, cM0, PSA: 13.5, Grade Group: 4) - Signed by Kriss Colby MD on 10/21/2019  - Pathologic stage from 2019: Stage IIIB (pT3b, pN0, cM0, PSA: 13.5, Grade Group: 4) - Signed by Kriss Colby MD on 10/21/2019      DIAGNOSIS/treatment history: PSA elevation after surgery for high risk prostate cancer (PSA 13, [place] 4+4, pT3bN0, R0 resection) 19 followed by adjuvant salvage radiation therapy completed 3/4/20    INTERVAL HISTORY:   Lesly Noble is a 66 y.o.. male who presents for follow-up the above diagnosis and treatment history. He is doing well and has no new problems or complaints. He is not have any new problems with his bladder and denies any dysuria, hematuria, frequency, urgency, nocturia, or problems with diarrhea/constipation. He has not seen any blood in his urine or bowel movements. PSA last year was undetectable and will have another PSA done tomorrow.      MEDICATIONS:    Current Outpatient Medications:     rosuvastatin (CRESTOR) 10 MG tablet, TAKE 1 TABLET BY MOUTH ONCE DAILY FOR 30 DAYS, Disp: , Rfl:     docusate sodium (COLACE, DULCOLAX) 100 MG CAPS, Take 100 mg by mouth 2 times daily (Patient taking differently: Take 100 mg by mouth as needed ), Disp: 60 capsule, Rfl: 1    amLODIPine (NORVASC) 5 MG tablet, Take 5 mg by mouth daily , Disp: , Rfl: 5    lisinopril (PRINIVIL;ZESTRIL) 20 MG tablet, Take 20 mg by mouth 2 times daily , Disp: , Rfl: 5    metoprolol tartrate (LOPRESSOR) 50 MG tablet, Take 50 mg by mouth 2 times daily , Disp: , Rfl: 5    ALLERGIES:  Allergies   Allergen Reactions    Aspirin Swelling     LIP SWELLING         REVIEW OF SYSTEMS:    A full 14 point review of systems was performed and assessed and found to be negative except as noted above. PHYSICAL EXAMINATION:    ECO Asymptomatic    VITAL SIGNS: BP (!) 160/97   Pulse 64   Temp 97 °F (36.1 °C) (Temporal)   Resp 18   Wt 166 lb 9.6 oz (75.6 kg)   SpO2 99%   BMI 26.49 kg/m²         LABS:  WBC   Date Value Ref Range Status   2019 11.2 3.5 - 11.3 k/uL Final     Hemoglobin   Date Value Ref Range Status   2019 11.6 (L) 13.0 - 17.0 g/dL Final     Platelets   Date Value Ref Range Status   2019 227 138 - 453 k/uL Final     CEA   Date Value Ref Range Status   2019 3.8 <3.9 ng/mL Final     Comment:     The Roche \"ECLIA\" assay is used. Results obtained with different assay methods cannot be   used interchangeably. 2019 5.9 (H) <3.9 ng/mL Final     Comment:     The Roche \"ECLIA\" assay is used. Results obtained with different assay methods cannot be   used interchangeably. 10/04/2016 3.8 <3.9 ng/mL Final     Comment:     The Roche \"ECLIA\" assay is used. Results obtained with different assay methods   cannot be used interchangeably. Performed at [address] [phone]       PSA   Date Value Ref Range Status   2020 <0.01 <4.1 ug/L Final     Comment:     The Roche \"ECLIA\" assay is used. Results obtained with different assay methods cannot be   used interchangeably. 2019 13.46 (H) <4.1 ug/L Final     Comment:     The Roche \"ECLIA\" assay is used. Results obtained with different assay methods cannot be   used interchangeably. 10/04/2016 3.29 <4.1 ug/L Final     Comment:     The Roche \"ECLIA\" assay is used. Results obtained with different assay methods   cannot be used interchangeably.   Performed at [address] [place] [address] [address] [facility], [address] [place] [address] [phone]     08/19/2015 2.2 0.0 - 4.0 ug/L Final     Comment:     Siemens Immulite 2000 immunometric chemiluminescent assay is used. Results   obtained with different assay methods or instruments cannot be used   interchangeably. 08/19/2015 2.46 <4.1 ug/L Final     Comment:     The Roche \"ECLIA\" assay is used. Results obtained with different assay methods   cannot be used interchangeably. Performed at [facility] [id number] [place] [address], [address] [place] [address] [phone]         IMAGING:   None new      ASSESSMENT AND PLAN:  Lorenzo Irving is a 66 y.o. male with a Cancer Staging  Prostate cancer ([facility] [address]  Staging form: Prostate, AJCC 8th Edition  - Clinical stage from 7/24/2019: Stage Unknown (cT1c, cNX, cM0, PSA: 13.5, Grade Group: 4) - Signed by Yane Barnard MD on 10/21/2019  - Pathologic stage from 9/18/2019: Stage IIIB (pT3b, pN0, cM0, PSA: 13.5, Grade Group: 4) - Signed by Yane Barnard MD on 10/21/2019  . He is doing very well and has no issues or problems from treatment. We will get a PSA done soon at his convenience and I will see him back in 6 months for follow-up and continued oncologic surveillance. He knows to call at anytime with any questions or concerns may arise interim. Patient was in agreement with my recommendations. All questions were answered to their satisfaction. Patient was advised to contact us anytime should they have any questions or concerns. ADDENDUM: PSA done 7/16/21 <0.06 (essentially undetectable). Patient called with results. Time spent 35 minutes with 50% face to face with the patient. Electronically signed by Victoriano Gonzalez MD on 7/20/2021 at 1:02 PM        Medications Prescribed:   New Prescriptions    No medications on file       Orders: No orders of the defined types were placed in this encounter.       CC:  Patient Care Team:  Eligha Koyanagi, MD as PCP - General (Sports Medicine)  He Saini MD as Consulting Physician (Gastroenterology)  Naye Otto MD as Consulting Physician (Urology)  Arelis Alonso MD as Consulting Physician (Radiation Oncology)

## 2022-07-11 DIAGNOSIS — C61 MALIGNANT NEOPLASM OF PROSTATE (HCC): Primary | ICD-10-CM

## 2022-07-22 DIAGNOSIS — C61 MALIGNANT NEOPLASM OF PROSTATE (HCC): ICD-10-CM

## 2022-07-27 ENCOUNTER — APPOINTMENT (OUTPATIENT)
Dept: RADIATION ONCOLOGY | Facility: MEDICAL CENTER | Age: 72
End: 2022-07-27
Attending: RADIOLOGY
Payer: MEDICARE

## 2022-07-27 ENCOUNTER — TELEPHONE (OUTPATIENT)
Dept: RADIATION ONCOLOGY | Facility: MEDICAL CENTER | Age: 72
End: 2022-07-27

## 2022-08-08 ENCOUNTER — HOSPITAL ENCOUNTER (OUTPATIENT)
Dept: RADIATION ONCOLOGY | Facility: MEDICAL CENTER | Age: 72
Discharge: HOME | End: 2022-08-08
Attending: RADIOLOGY
Payer: MEDICARE

## 2022-08-08 VITALS
BODY MASS INDEX: 26.52 KG/M2 | DIASTOLIC BLOOD PRESSURE: 89 MMHG | HEART RATE: 62 BPM | WEIGHT: 166.8 LBS | RESPIRATION RATE: 18 BRPM | TEMPERATURE: 98 F | OXYGEN SATURATION: 98 % | SYSTOLIC BLOOD PRESSURE: 169 MMHG

## 2022-08-08 PROCEDURE — 99212 OFFICE O/P EST SF 10 MIN: CPT | Performed by: RADIOLOGY

## 2022-08-08 PROCEDURE — 99213 OFFICE O/P EST LOW 20 MIN: CPT | Performed by: RADIOLOGY

## 2022-08-08 NOTE — PROGRESS NOTES
Krystal Patel  8/8/2022  3:24 PM      Vitals:    08/08/22 1522   BP: (!) 169/89   Pulse: 62   Resp: 18   Temp: 98 °F (36.7 °C)   SpO2: 98%    :     Pain Assessment: None - Denies Pain          Wt Readings from Last 1 Encounters:   08/08/22 166 lb 12.8 oz (75.7 kg)                Current Outpatient Medications:     rosuvastatin (CRESTOR) 10 MG tablet, TAKE 1 TABLET BY MOUTH ONCE DAILY FOR 30 DAYS, Disp: , Rfl:     docusate sodium (COLACE, DULCOLAX) 100 MG CAPS, Take 100 mg by mouth 2 times daily (Patient taking differently: Take 100 mg by mouth as needed ), Disp: 60 capsule, Rfl: 1    amLODIPine (NORVASC) 5 MG tablet, Take 5 mg by mouth daily , Disp: , Rfl: 5    lisinopril (PRINIVIL;ZESTRIL) 20 MG tablet, Take 20 mg by mouth 2 times daily , Disp: , Rfl: 5    metoprolol tartrate (LOPRESSOR) 50 MG tablet, Take 50 mg by mouth 2 times daily , Disp: , Rfl: 5    Immunizations:    Influenza status:    []   Current   [x]   Patient declined    Pneumococcal status:  []   Current  [x]   Patient declined  Covid status:   [x]  Dose #1:                     [x]  Dose #2:               []   Patient declined    Smoking Status:    [] Smoker - PPD:   [] Nonsmoker - Quit Date:               [x] Never a smoker      Cancer Screening:  Colonoscopy   [] Current       [] Not current   [] Not current, but scheduled   [x] NA  Mammogram   [] Current       [] Not current   [] Not current, but scheduled   [x] NA  Prostate           [x] Current       [] Not current   [] Not current, but scheduled   [] NA  PAP/Pelvic      [] Current       [] Not current   [] Not current, but scheduled   [x] NA  Skin                 [] Current       [] Not current   [] Not current, but scheduled   [x] NA     Hormone:none  Lupron []   Last dose given:           Next dose due:   Eligard []   Last dose given:           Next dose due:   Aromatase Inhibitors []   Medication name:   N/A:  []           FALLS RISK SCREEN  Instructions:  Assess the patient and enter the appropriate indicators that are present for fall risk identification. Total the numbers entered and assign a fall risk score from Table 2.  Reassess patient at a minimum every 12 weeks or with status change. Assessment   Date  8/8/2022     1. Mental Ability: confusion/cognitively impaired 0     2. Elimination Issues: incontinence, frequency 0       3. Ambulatory: use of assistive devices (walker, cane, off-loading devices),        attached to equipment (IV pole, oxygen) 0     4. Sensory Limitations: dizziness, vertigo, impaired vision 0     5. Age less than 65        0     6. Age 72 or greater 1     7. Medication: diuretics, strong analgesics, hypnotics, sedatives,        antihypertensive agents 0   8. Falls:  recent history of falls within the last 3 months (not to include slipping or        tripping) 0   TOTAL 1    If score of 4 or greater was education given? No           TABLE 2   Risk Score Risk Level Plan of Care   0-3 Little or  No Risk 1. Provide assistance as indicated for ambulation activities  2. Reorient confused/cognitively impaired patient  3. Chair/bed in low position, stretcher/bed with siderails up except when performing patient care activities  5. Educate patient/family/caregiver on falls prevention  6.  Reassess in 12 weeks or with any noted change in patient condition which places them at a risk for a fall   4-6 Moderate Risk 1. Provide assistance as indicated for ambulation activities  2. Reorient confused/cognitively impaired patient  3. Chair/bed in low position, stretcher/bed with siderails up except when performing patient care activities  4. Educate patient/family/caregiver on falls prevention     7 or   Higher High Risk 1. Place patient in easily observable treatment room  2. Patient attended at all times by family member or staff  3. Provide assistance as indicated for ambulation activities  4. Reorient confused/cognitively impaired patient  5.   Chair/bed in low position, stretcher/bed with siderails up except when performing patient care activities  6. Educate patient/family/caregiver on falls prevention         PLAN: Patient is seen today in follow up from radiation therapy for prostate cancer. PSA 7/21/22 <0.06. PSA 10/11/21 0.01.  AUA assessment form completed. Dr. Alejandra San notified of assessment and examined patient. He will continue to follow with Urologist (Dr. Clarita Garrett) and will follow up with Radiation Oncology as needed.         Germain Conley RN

## 2022-08-08 NOTE — PROGRESS NOTES
[address]            Radiation Oncology          [address] [facility]          [place], [address]        Paulina Coburn: [phone]        F: [phone]       [email or web address]         Date of Service: 2022     Location:   [place] [address] FOLLOW UP NOTE    Patient ID:   Marden Denver  : 1950   MRN: [id number]    DIAGNOSIS:  Cancer Staging  Prostate cancer [facility])  Staging form: Prostate, AJCC 8th Edition  - Clinical stage from 2019: Stage Unknown (cT1c, cNX, cM0, PSA: 13.5, Grade Group: 4) - Signed by Afton Holstein, MD on 10/21/2019  - Pathologic stage from 2019: Stage IIIB (pT3b, pN0, cM0, PSA: 13.5, Grade Group: 4) - Signed by Afton Holstein, MD on 10/21/2019    DIAGNOSIS/treatment history: PSA elevation after surgery for high risk prostate cancer (PSA 13, Luke 4+4, pT3bN0, R0 resection) 19 followed by adjuvant salvage radiation therapy completed 3/4/20    INTERVAL HISTORY:   Marden Denver is a 74 y.o.. male who presents for follow-up the above diagnosis and treatment history. He does not have any new dysuria, hematuria, frequency, urgency, or problems with his bowels. He is not have any issues with constipation or diarrhea. He is not seeing any blood in the bowel movements. He spoke with urology in October of last year and was doing well at that time. PSA done in July of this year was undetectable. We saw him a year ago and PSA was undetectable at that time as well. He has been having urinary incontinence at night at times. This been going on the past 4 to 6 months. He has been wearing a pull-up at night. He says he does not have any incontinence during the day. He says he does not have any continence when he coughs or sneezes. He has not spoken about this to urology but he is seeing them in a few weeks.     MEDICATIONS:    Current Outpatient Medications:     rosuvastatin (CRESTOR) 10 MG tablet, TAKE 1 TABLET BY MOUTH ONCE DAILY FOR 30 DAYS, Disp: , Rfl:     docusate sodium (COLACE, DULCOLAX) 100 MG CAPS, Take 100 mg by mouth 2 times daily (Patient taking differently: Take 100 mg by mouth as needed ), Disp: 60 capsule, Rfl: 1    amLODIPine (NORVASC) 5 MG tablet, Take 5 mg by mouth daily , Disp: , Rfl: 5    lisinopril (PRINIVIL;ZESTRIL) 20 MG tablet, Take 20 mg by mouth 2 times daily , Disp: , Rfl: 5    metoprolol tartrate (LOPRESSOR) 50 MG tablet, Take 50 mg by mouth 2 times daily , Disp: , Rfl: 5    ALLERGIES:  Allergies   Allergen Reactions    Aspirin Swelling     LIP SWELLING         REVIEW OF SYSTEMS:    A full 14 point review of systems was performed and assessed and found to be negative except as noted above. PHYSICAL EXAMINATION:    CHAPERONE: Not Required    ECO Asymptomatic    VITAL SIGNS: BP (!) 169/89   Pulse 62   Temp 98 °F (36.7 °C) (Temporal)   Resp 18   Wt 166 lb 12.8 oz (75.7 kg)   SpO2 98%   BMI 26.52 kg/m²   GENERAL:  General appearance is that of a well-nourished, well-developed in no apparent distress. HEENT: Normocephalic, atraumatic, EOMI, moist mucosa, no erythema. Indirect exam .  NECK:  No adenopathy or a palpable thyroid mass, trachea is midline. LYMPHATICS: No cervical, supraclavicular, or axillary adenopathy. HEART:  Normal rate and regular rhythm, normal heart sounds. LUNGS:  Pulmonary effort normal. Normal breath sounds. ABDOMEN:  Soft, nontender, non distended, and no hepatosplenomegaly. EXTREMITIES:  No edema. No calf tenderness. MSK:  No spinal tenderness. Normal ROM. NEUROLOGICAL: Alert and oriented. Strength and sensation intact bilaterally. No focal deficits. PSYCH: Mood normal, behavior normal.  SKIN: No erythema, no desquamation.       LABS:  WBC   Date Value Ref Range Status   2019 11.2 3.5 - 11.3 k/uL Final     Hemoglobin   Date Value Ref Range Status   2019 11.6 (L) 13.0 - 17.0 g/dL Final     Platelets   Date Value Ref Range Status   2019 227 138 - 453 k/uL Final     CEA   Date Value Ref Range Status   05/06/2019 3.8 <3.9 ng/mL Final     Comment:     The Roche \"ECLIA\" assay is used. Results obtained with different assay methods cannot be   used interchangeably. 03/06/2019 5.9 (H) <3.9 ng/mL Final     Comment:     The Roche \"ECLIA\" assay is used. Results obtained with different assay methods cannot be   used interchangeably. 10/04/2016 3.8 <3.9 ng/mL Final     Comment:     The Roche \"ECLIA\" assay is used. Results obtained with different assay methods   cannot be used interchangeably. Performed at [facility] [address] [address] [phone]       PSA   Date Value Ref Range Status   06/23/2020 <0.01 <4.1 ug/L Final     Comment:     The Roche \"ECLIA\" assay is used. Results obtained with different assay methods cannot be   used interchangeably. 03/06/2019 13.46 (H) <4.1 ug/L Final     Comment:     The Roche \"ECLIA\" assay is used. Results obtained with different assay methods cannot be   used interchangeably. 10/04/2016 3.29 <4.1 ug/L Final     Comment:     The Roche \"ECLIA\" assay is used. Results obtained with different assay methods   cannot be used interchangeably. Performed at [address] [place] [address] [phone]     08/19/2015 2.2 0.0 - 4.0 ug/L Final     Comment:     S[employer]ens Immulite 2000 immunometric chemiluminescent assay is used. Results   obtained with different assay methods or instruments cannot be used   interchangeably. 08/19/2015 2.46 <4.1 ug/L Final     Comment:     The Roche \"ECLIA\" assay is used. Results obtained with different assay methods   cannot be used interchangeably.   Performed at [facility] [address] [address] [phone]       PSA 7/2022 undetectable     IMAGING:   None new       ASSESSMENT AND PLAN:  Humberto Cormier is a 74 y.o. male with a Cancer Staging  Prostate cancer [facility])  Staging form: Prostate, AJCC 8th Edition  - Clinical stage from 7/24/2019: Stage Unknown (cT1c, cNX, cM0, PSA: 13.5, Grade Group: 4) - Signed by Mariel Garcia MD on 10/21/2019  - Pathologic stage from 9/18/2019: Stage IIIB (pT3b, pN0, cM0, PSA: 13.5, Grade Group: 4) - Signed by Mariel Garcia MD on 10/21/2019  . He is doing very well 2 and half years out from treatment. He is being followed by urology as well. I told him not sure about the etiology of his nighttime intermittent incontinence. I told him to speak about it with urology as an able to do some tests/take a look in the bladder. We will see him back in 1 year with a PSA beforehand. He knows to call anytime with any questions or concerns may arise in the interim. Patient is recommended to come back in 1 year for another follow up visit and exam, or can call to come see us sooner if symptoms change. Patient was in agreement with my recommendations. All questions were answered to their satisfaction. Patient was advised to contact us anytime should they have any questions or concerns. Electronically signed by Goran Whitman MD on 8/9/2022 at 7:51 AM        Medications Prescribed:   New Prescriptions    No medications on file       Orders: No orders of the defined types were placed in this encounter.       CC:  Patient Care Team:  Tip Scott MD as PCP - General (Sports Medicine)  Yoanna Noriega MD as Consulting Physician (Gastroenterology)  Mariel Garcia MD as Consulting Physician (Urology)  Goran Whitman MD as Consulting Physician (Radiation Oncology)

## 2022-11-11 ENCOUNTER — TELEPHONE (OUTPATIENT)
Dept: RADIATION ONCOLOGY | Facility: MEDICAL CENTER | Age: 72
End: 2022-11-11

## 2022-11-11 DIAGNOSIS — C61 PROSTATE CANCER (HCC): Primary | ICD-10-CM

## 2024-09-27 LAB — PROSTATE SPECIFIC ANTIGEN: <0.06 NG/ML

## (undated) DEVICE — CANNULA SEAL

## (undated) DEVICE — TIP APPL L45CM FLX FOR SEAL EVICEL

## (undated) DEVICE — Z DISCONTINUED USE 2717540 SUTURE ABSORBABLE MONOFILAMENT 3-0 RB 1 6 IN STRATAFIX SPRL

## (undated) DEVICE — ARM DRAPE

## (undated) DEVICE — CATHETER URETH 18FR BLLN 30CC 2 W F INF CTRL BARDX

## (undated) DEVICE — COVER,LIGHT HANDLE,FLX,2/PK: Brand: MEDLINE INDUSTRIES, INC.

## (undated) DEVICE — COUNTER NDL 10 COUNT HLD 20 FOAM BLK SGL MAG

## (undated) DEVICE — GARMENT,MEDLINE,DVT,INT,CALF,MED, GEN2: Brand: MEDLINE

## (undated) DEVICE — BLADELESS OBTURATOR: Brand: WECK VISTA

## (undated) DEVICE — DEFENDO AIR WATER SUCTION AND BIOPSY VALVE KIT FOR  OLYMPUS: Brand: DEFENDO AIR/WATER/SUCTION AND BIOPSY VALVE

## (undated) DEVICE — GOWN,AURORA,NONREINFORCED,LARGE: Brand: MEDLINE

## (undated) DEVICE — SUTURE MCRYL SZ 3-0 L27IN ABSRB VLT L17MM RB-1 1/2 CIR Y305H

## (undated) DEVICE — CONTAINER,SPECIMEN,4OZ,OR STRL: Brand: MEDLINE

## (undated) DEVICE — 60 ML SYRINGE,CATHETER TIP: Brand: MONOJECT

## (undated) DEVICE — TIP COVER ACCESSORY

## (undated) DEVICE — CLIP INT XL YEL POLYMER HEM-O-LOK WECK

## (undated) DEVICE — GARMENT COMPR STD FOR 17IN CALF UNIF THER FLOTRN

## (undated) DEVICE — CHLORAPREP 26ML ORANGE

## (undated) DEVICE — TOWEL,OR,DSP,ST,NATURAL,DLX,4/PK,20PK/CS: Brand: MEDLINE

## (undated) DEVICE — SUTURE VCRL SZ 1 L27IN ABSRB UD L36MM CT-1 1/2 CIR JJ40G

## (undated) DEVICE — DRESSING TRNSPAR W5XL4.5IN FLM SHT SEMIPERMEABLE WIND

## (undated) DEVICE — METER,URINE,400ML,DRAIN BAG,L/F,LL: Brand: MEDLINE

## (undated) DEVICE — STRAP,CATHETER,ELASTIC,HOOK&LOOP: Brand: MEDLINE

## (undated) DEVICE — 3M™ IOBAN™ 2 ANTIMICROBIAL INCISE DRAPE 6650EZ: Brand: IOBAN™ 2

## (undated) DEVICE — AIRSEAL 12 MM ACCESS PORT AND PALM GRIP OBTURATOR WITH BLADELESS OPTICAL TIP, 120 MM LENGTH: Brand: AIRSEAL

## (undated) DEVICE — SOLUTION ANTIFOG VIS SYS CLEARIFY LAPSCP

## (undated) DEVICE — TRI-LUMEN FILTERED TUBE SET WITH ACTIVATED CHARCOAL FILTER: Brand: AIRSEAL

## (undated) DEVICE — GOWN,POLY REINFORCED,LG: Brand: MEDLINE

## (undated) DEVICE — Device

## (undated) DEVICE — SUTURE VCRL SZ 1 L18IN ABSRB VLT CT-1 L36MM 1/2 CIR J741D

## (undated) DEVICE — GLOVE ORANGE PI 7   MSG9070

## (undated) DEVICE — POSITIONER,HEAD,MULTIRING,36CS: Brand: MEDLINE

## (undated) DEVICE — PROTECTOR ULN NRV PUR FOAM HK LOOP STRP ANATOMICALLY

## (undated) DEVICE — 40580 - THE PINK PAD - ADVANCED TRENDELENBURG POSITIONING KIT: Brand: 40580 - THE PINK PAD - ADVANCED TRENDELENBURG POSITIONING KIT

## (undated) DEVICE — SUTURE V-LOC 180 SZ 0 L9IN ABSRB GRN GS-21 L37MM 1/2 CIR VLOCL0346

## (undated) DEVICE — ELECTRO LUBE IS A SINGLE PATIENT USE DEVICE THAT IS INTENDED TO BE USED ON ELECTROSURGICAL ELECTRODES TO REDUCE STICKING.: Brand: KEY SURGICAL ELECTRO LUBE

## (undated) DEVICE — GLOVE SURG SZ 6 THK91MIL LTX FREE SYN POLYISOPRENE ANTI

## (undated) DEVICE — JELLY,LUBE,STERILE,FLIP TOP,TUBE,2-OZ: Brand: MEDLINE

## (undated) DEVICE — SUTURE MCRYL SZ 4-0 L18IN ABSRB UD L16MM PC-3 3/8 CIR PRIM Y845G

## (undated) DEVICE — SHEET DRAPE FULL 70X100

## (undated) DEVICE — SKIN AFFIX SURG ADHESIVE 72/CS 0.55ML: Brand: MEDLINE